# Patient Record
Sex: FEMALE | Race: WHITE | NOT HISPANIC OR LATINO
[De-identification: names, ages, dates, MRNs, and addresses within clinical notes are randomized per-mention and may not be internally consistent; named-entity substitution may affect disease eponyms.]

---

## 2018-05-25 ENCOUNTER — APPOINTMENT (OUTPATIENT)
Dept: INTERNAL MEDICINE | Facility: CLINIC | Age: 28
End: 2018-05-25
Payer: COMMERCIAL

## 2018-05-25 ENCOUNTER — NON-APPOINTMENT (OUTPATIENT)
Age: 28
End: 2018-05-25

## 2018-05-25 VITALS
HEART RATE: 92 BPM | WEIGHT: 179 LBS | OXYGEN SATURATION: 100 % | DIASTOLIC BLOOD PRESSURE: 78 MMHG | HEIGHT: 62.5 IN | TEMPERATURE: 98.4 F | BODY MASS INDEX: 32.12 KG/M2 | SYSTOLIC BLOOD PRESSURE: 120 MMHG

## 2018-05-25 DIAGNOSIS — Z87.19 PERSONAL HISTORY OF OTHER DISEASES OF THE DIGESTIVE SYSTEM: ICD-10-CM

## 2018-05-25 DIAGNOSIS — Z83.3 FAMILY HISTORY OF DIABETES MELLITUS: ICD-10-CM

## 2018-05-25 DIAGNOSIS — Z81.8 FAMILY HISTORY OF OTHER MENTAL AND BEHAVIORAL DISORDERS: ICD-10-CM

## 2018-05-25 DIAGNOSIS — Z78.9 OTHER SPECIFIED HEALTH STATUS: ICD-10-CM

## 2018-05-25 DIAGNOSIS — E28.2 POLYCYSTIC OVARIAN SYNDROME: ICD-10-CM

## 2018-05-25 DIAGNOSIS — Z82.49 FAMILY HISTORY OF ISCHEMIC HEART DISEASE AND OTHER DISEASES OF THE CIRCULATORY SYSTEM: ICD-10-CM

## 2018-05-25 DIAGNOSIS — G43.909 MIGRAINE, UNSPECIFIED, NOT INTRACTABLE, W/OUT STATUS MIGRAINOSUS: ICD-10-CM

## 2018-05-25 DIAGNOSIS — Z00.00 ENCOUNTER FOR GENERAL ADULT MEDICAL EXAMINATION W/OUT ABNORMAL FINDINGS: ICD-10-CM

## 2018-05-25 DIAGNOSIS — J45.909 UNSPECIFIED ASTHMA, UNCOMPLICATED: ICD-10-CM

## 2018-05-25 PROCEDURE — 93000 ELECTROCARDIOGRAM COMPLETE: CPT

## 2018-05-25 PROCEDURE — 99385 PREV VISIT NEW AGE 18-39: CPT | Mod: 25

## 2018-05-25 PROCEDURE — 36415 COLL VENOUS BLD VENIPUNCTURE: CPT

## 2018-05-25 NOTE — HEALTH RISK ASSESSMENT
[Excellent] : ~his/her~  mood as  excellent [Patient reported PAP Smear was normal] : Patient reported PAP Smear was normal [None] : None [With Family] : lives with family [Employed] : employed [Graduate School] : graduate school [] :  [# Of Children ___] : has [unfilled] children [Sexually Active] : sexually active [Smoke Detector] : smoke detector [Carbon Monoxide Detector] : carbon monoxide detector [Seat Belt] :  uses seat belt [Sunscreen] : uses sunscreen [] : No [Change in mental status noted] : No change in mental status noted [Reports changes in hearing] : Reports no changes in hearing [Reports changes in vision] : Reports no changes in vision [Reports changes in dental health] : Reports no changes in dental health [MammogramDate] : never  [PapSmearDate] : 03/17 [FreeTextEntry2] : teacher

## 2018-05-25 NOTE — PHYSICAL EXAM
[No Acute Distress] : no acute distress [Well Nourished] : well nourished [Well Developed] : well developed [Well-Appearing] : well-appearing [Normal Sclera/Conjunctiva] : normal sclera/conjunctiva [PERRL] : pupils equal round and reactive to light [EOMI] : extraocular movements intact [Normal Outer Ear/Nose] : the outer ears and nose were normal in appearance [Normal Oropharynx] : the oropharynx was normal [Normal TMs] : both tympanic membranes were normal [Supple] : supple [No Lymphadenopathy] : no lymphadenopathy [Thyroid Normal, No Nodules] : the thyroid was normal and there were no nodules present [No Respiratory Distress] : no respiratory distress  [Clear to Auscultation] : lungs were clear to auscultation bilaterally [No Accessory Muscle Use] : no accessory muscle use [Normal Rate] : normal rate  [Regular Rhythm] : with a regular rhythm [Normal S1, S2] : normal S1 and S2 [No Murmur] : no murmur heard [No Varicosities] : no varicosities [Pedal Pulses Present] : the pedal pulses are present [No Edema] : there was no peripheral edema [Soft] : abdomen soft [Non Tender] : non-tender [Non-distended] : non-distended [No Masses] : no abdominal mass palpated [No HSM] : no HSM [Normal Bowel Sounds] : normal bowel sounds [Normal Posterior Cervical Nodes] : no posterior cervical lymphadenopathy [Normal Anterior Cervical Nodes] : no anterior cervical lymphadenopathy [No CVA Tenderness] : no CVA  tenderness [No Spinal Tenderness] : no spinal tenderness [No Joint Swelling] : no joint swelling [Grossly Normal Strength/Tone] : grossly normal strength/tone [No Rash] : no rash [Normal Gait] : normal gait [Coordination Grossly Intact] : coordination grossly intact [No Focal Deficits] : no focal deficits [Deep Tendon Reflexes (DTR)] : deep tendon reflexes were 2+ and symmetric [Normal Affect] : the affect was normal [Normal Insight/Judgement] : insight and judgment were intact [Normal Mood] : the mood was normal [de-identified] : defer to GYN [de-identified] : defer to GYN

## 2018-05-25 NOTE — ASSESSMENT
[FreeTextEntry1] : Healthy young female comes for an annual exam.\par \par Check routine labs as below. EKG is normal. \par \par Diet and exercise discussed, BMI > 30. She will improve lifestyle changes.\par \par We discussed immunizations and patient is UTD. To receive influenza vaccine in the Fall.\par \par She is monogamous with her . Screen for HIV. \par \par She will see GYN next week for annual exam and PAP smear. Mammogram screening at age 38. She will discuss with her GYN about restarting Metformin for PCOS.\par \par Her asthma has remained quiet, no recent flares. \par \par It is unclear etiology of her recent right facial pain, BP was borderline elevated then but is now normal. Possible migraine episode. Will send Maxalt PRN.  \par \par RTO in 1 yr for annual exam or PRN for acute care.

## 2018-05-25 NOTE — PAST MEDICAL HISTORY
[Menstruating] : menstruating [Menarche Age ____] : age at menarche was [unfilled] [Normal Amount/Duration] : it was of a normal amount and duration [Regular Cycle Intervals] : have been regular [Total Preg ___] : G[unfilled] [Live Births ___] : P[unfilled]

## 2018-05-25 NOTE — HISTORY OF PRESENT ILLNESS
[FreeTextEntry1] : New pt - CPE [de-identified] : 27 yo female comes to establish medical care and for an annual exam. Last PCP was Dr. Nithin Breen in [place], last seen about 2 yr ago. \par \par Last Sunday afternoon, patient developed pain on right side of head and behind eye. Lasted a few days, stopped 2 days ago. Her BP reading was recently elevated in the school nurse's office, was 135/92. She has history pre-eclampsia in 2016, never took BP medication then. She feels better now but still not herself. No facial numbness. No changes in speech or vision.

## 2018-05-26 ENCOUNTER — MOBILE ON CALL (OUTPATIENT)
Age: 28
End: 2018-05-26

## 2018-05-30 DIAGNOSIS — E55.9 VITAMIN D DEFICIENCY, UNSPECIFIED: ICD-10-CM

## 2018-05-30 LAB
25(OH)D3 SERPL-MCNC: 25.4 NG/ML
ALBUMIN SERPL ELPH-MCNC: 4.6 G/DL
ALP BLD-CCNC: 133 U/L
ALT SERPL-CCNC: 19 U/L
ANION GAP SERPL CALC-SCNC: 14 MMOL/L
APPEARANCE: CLEAR
AST SERPL-CCNC: 24 U/L
BACTERIA: NEGATIVE
BASOPHILS # BLD AUTO: 0.02 K/UL
BASOPHILS NFR BLD AUTO: 0.2 %
BILIRUB SERPL-MCNC: 0.7 MG/DL
BILIRUBIN URINE: NEGATIVE
BLOOD URINE: ABNORMAL
BUN SERPL-MCNC: 13 MG/DL
CALCIUM SERPL-MCNC: 9.3 MG/DL
CHLORIDE SERPL-SCNC: 103 MMOL/L
CHOLEST SERPL-MCNC: 172 MG/DL
CHOLEST/HDLC SERPL: 3.8 RATIO
CO2 SERPL-SCNC: 24 MMOL/L
COLOR: YELLOW
CREAT SERPL-MCNC: 0.72 MG/DL
EOSINOPHIL # BLD AUTO: 0.1 K/UL
EOSINOPHIL NFR BLD AUTO: 1.1 %
GLUCOSE QUALITATIVE U: NEGATIVE MG/DL
GLUCOSE SERPL-MCNC: 80 MG/DL
HBA1C MFR BLD HPLC: 4.9 %
HCT VFR BLD CALC: 39 %
HDLC SERPL-MCNC: 45 MG/DL
HGB BLD-MCNC: 13.1 G/DL
HIV1+2 AB SPEC QL IA.RAPID: NONREACTIVE
HYALINE CASTS: 1 /LPF
IMM GRANULOCYTES NFR BLD AUTO: 0.3 %
KETONES URINE: NEGATIVE
LDLC SERPL CALC-MCNC: 109 MG/DL
LEUKOCYTE ESTERASE URINE: ABNORMAL
LYMPHOCYTES # BLD AUTO: 3.25 K/UL
LYMPHOCYTES NFR BLD AUTO: 36.6 %
MAN DIFF?: NORMAL
MCHC RBC-ENTMCNC: 29.4 PG
MCHC RBC-ENTMCNC: 33.6 GM/DL
MCV RBC AUTO: 87.6 FL
MICROSCOPIC-UA: NORMAL
MONOCYTES # BLD AUTO: 0.27 K/UL
MONOCYTES NFR BLD AUTO: 3 %
NEUTROPHILS # BLD AUTO: 5.22 K/UL
NEUTROPHILS NFR BLD AUTO: 58.8 %
NITRITE URINE: NEGATIVE
PH URINE: 8
PLATELET # BLD AUTO: 241 K/UL
POTASSIUM SERPL-SCNC: 4 MMOL/L
PROT SERPL-MCNC: 7.7 G/DL
PROTEIN URINE: NEGATIVE MG/DL
RBC # BLD: 4.45 M/UL
RBC # FLD: 12.9 %
RED BLOOD CELLS URINE: 1 /HPF
SODIUM SERPL-SCNC: 141 MMOL/L
SPECIFIC GRAVITY URINE: 1.01
SQUAMOUS EPITHELIAL CELLS: 3 /HPF
T4 FREE SERPL-MCNC: 1.2 NG/DL
TRIGL SERPL-MCNC: 88 MG/DL
TSH SERPL-ACNC: 2.57 UIU/ML
UROBILINOGEN URINE: NEGATIVE MG/DL
WBC # FLD AUTO: 8.89 K/UL
WHITE BLOOD CELLS URINE: 1 /HPF

## 2018-11-23 ENCOUNTER — APPOINTMENT (OUTPATIENT)
Dept: ANTEPARTUM | Facility: CLINIC | Age: 28
End: 2018-11-23
Payer: COMMERCIAL

## 2018-11-23 ENCOUNTER — ASOB RESULT (OUTPATIENT)
Age: 28
End: 2018-11-23

## 2018-11-23 PROCEDURE — 76813 OB US NUCHAL MEAS 1 GEST: CPT

## 2018-11-23 PROCEDURE — 99241 OFFICE CONSULTATION NEW/ESTAB PATIENT 15 MIN: CPT | Mod: 25

## 2018-11-23 PROCEDURE — 76801 OB US < 14 WKS SINGLE FETUS: CPT

## 2018-11-23 PROCEDURE — 36416 COLLJ CAPILLARY BLOOD SPEC: CPT

## 2018-12-13 ENCOUNTER — APPOINTMENT (OUTPATIENT)
Dept: INTERNAL MEDICINE | Facility: CLINIC | Age: 28
End: 2018-12-13
Payer: COMMERCIAL

## 2018-12-13 VITALS
OXYGEN SATURATION: 100 % | TEMPERATURE: 99.2 F | WEIGHT: 174 LBS | BODY MASS INDEX: 30.83 KG/M2 | DIASTOLIC BLOOD PRESSURE: 70 MMHG | SYSTOLIC BLOOD PRESSURE: 110 MMHG | HEART RATE: 124 BPM | HEIGHT: 63 IN

## 2018-12-13 DIAGNOSIS — Z87.09 PERSONAL HISTORY OF OTHER DISEASES OF THE RESPIRATORY SYSTEM: ICD-10-CM

## 2018-12-13 PROCEDURE — 99213 OFFICE O/P EST LOW 20 MIN: CPT

## 2018-12-13 RX ORDER — NORETHINDRONE ACETATE AND ETHINYL ESTRADIOL AND FERROUS FUMARATE 1MG-20(21)
1-20 KIT ORAL
Refills: 0 | Status: DISCONTINUED | COMMUNITY
End: 2018-12-13

## 2018-12-13 RX ORDER — RIZATRIPTAN BENZOATE 5 MG/1
5 TABLET ORAL
Qty: 7 | Refills: 1 | Status: DISCONTINUED | COMMUNITY
Start: 2018-05-25 | End: 2018-12-13

## 2018-12-13 NOTE — ASSESSMENT
[FreeTextEntry1] : Patient likely has acute sinusitis. She is penicillin-allergic and is 15 weeks pregnant. We will treat with a Z-pack (pregnancy category B). Start Flonase which is safe for pregnancy. Advised patient to discuss with OB if Z-pack is appropriate to take while pregnant. Continue Tylenol PRN. Monitor temps. Rest and hydrate well.

## 2018-12-13 NOTE — HISTORY OF PRESENT ILLNESS
[FreeTextEntry8] : Patient comes for an acute visit. \par \par She is here for evaluation of fever. She initially developed sinus pressure, nasal congestion, and cough productive of green sputum about 1 week ago. She had a fever last weekend then had recurrent fever of 100-101F last night. She took Tylenol for fever. She still has sinus pressure in frontal and maxillary areas. She is 15 weeks pregnant - following with OB, Dr. Lizette Ordaz in [place].  \par

## 2018-12-13 NOTE — REVIEW OF SYSTEMS
[Fever] : fever [Chills] : chills [Fatigue] : fatigue [Earache] : no earache [Hoarseness] : no hoarseness [Nasal Discharge] : nasal discharge [Sore Throat] : sore throat [Postnasal Drip] : postnasal drip [Chest Pain] : no chest pain [Lower Ext Edema] : no lower extremity edema [Shortness Of Breath] : no shortness of breath [Cough] : cough [Abdominal Pain] : no abdominal pain [Nausea] : no nausea [Diarrhea] : diarrhea

## 2018-12-13 NOTE — PHYSICAL EXAM
[No Acute Distress] : no acute distress [Well Nourished] : well nourished [Well Developed] : well developed [PERRL] : pupils equal round and reactive to light [EOMI] : extraocular movements intact [Normal Oropharynx] : the oropharynx was normal [Normal TMs] : both tympanic membranes were normal [Supple] : supple [No Lymphadenopathy] : no lymphadenopathy [No Respiratory Distress] : no respiratory distress  [Clear to Auscultation] : lungs were clear to auscultation bilaterally [Regular Rhythm] : with a regular rhythm [Normal S1, S2] : normal S1 and S2 [No Murmur] : no murmur heard [No Edema] : there was no peripheral edema [Soft] : abdomen soft [Non Tender] : non-tender [de-identified] : frontal/maxillary sinus tenderness  [de-identified] : tachycardic

## 2019-01-08 ENCOUNTER — MEDICATION RENEWAL (OUTPATIENT)
Age: 29
End: 2019-01-08

## 2019-01-24 ENCOUNTER — ASOB RESULT (OUTPATIENT)
Age: 29
End: 2019-01-24

## 2019-01-24 ENCOUNTER — APPOINTMENT (OUTPATIENT)
Dept: ANTEPARTUM | Facility: CLINIC | Age: 29
End: 2019-01-24
Payer: COMMERCIAL

## 2019-01-24 PROCEDURE — 76811 OB US DETAILED SNGL FETUS: CPT

## 2019-01-24 PROCEDURE — 76817 TRANSVAGINAL US OBSTETRIC: CPT | Mod: 59

## 2019-03-15 ENCOUNTER — OUTPATIENT (OUTPATIENT)
Dept: OUTPATIENT SERVICES | Facility: HOSPITAL | Age: 29
LOS: 1 days | End: 2019-03-15
Payer: COMMERCIAL

## 2019-03-15 DIAGNOSIS — Z3A.00 WEEKS OF GESTATION OF PREGNANCY NOT SPECIFIED: ICD-10-CM

## 2019-03-15 DIAGNOSIS — Z98.89 OTHER SPECIFIED POSTPROCEDURAL STATES: Chronic | ICD-10-CM

## 2019-03-15 DIAGNOSIS — K08.499 PARTIAL LOSS OF TEETH DUE TO OTHER SPECIFIED CAUSE, UNSPECIFIED CLASS: Chronic | ICD-10-CM

## 2019-03-15 DIAGNOSIS — O26.899 OTHER SPECIFIED PREGNANCY RELATED CONDITIONS, UNSPECIFIED TRIMESTER: ICD-10-CM

## 2019-03-15 LAB
APPEARANCE UR: CLEAR — SIGNIFICANT CHANGE UP
BACTERIA # UR AUTO: ABNORMAL
BILIRUB UR-MCNC: NEGATIVE — SIGNIFICANT CHANGE UP
COLOR SPEC: YELLOW — SIGNIFICANT CHANGE UP
DIFF PNL FLD: ABNORMAL
EPI CELLS # UR: ABNORMAL
GLUCOSE UR QL: NEGATIVE — SIGNIFICANT CHANGE UP
KETONES UR-MCNC: NEGATIVE — SIGNIFICANT CHANGE UP
LEUKOCYTE ESTERASE UR-ACNC: ABNORMAL
NITRITE UR-MCNC: NEGATIVE — SIGNIFICANT CHANGE UP
PH UR: 8.5 — HIGH (ref 5–8)
PROT UR-MCNC: NEGATIVE — SIGNIFICANT CHANGE UP
RBC CASTS # UR COMP ASSIST: SIGNIFICANT CHANGE UP /HPF (ref 0–4)
SP GR SPEC: 1.01 — LOW (ref 1.01–1.02)
UROBILINOGEN FLD QL: NEGATIVE — SIGNIFICANT CHANGE UP
WBC UR QL: ABNORMAL

## 2019-03-15 PROCEDURE — 81001 URINALYSIS AUTO W/SCOPE: CPT

## 2019-03-15 PROCEDURE — G0463: CPT

## 2019-03-15 PROCEDURE — 76817 TRANSVAGINAL US OBSTETRIC: CPT | Mod: 26

## 2019-03-15 PROCEDURE — 59025 FETAL NON-STRESS TEST: CPT

## 2019-04-11 ENCOUNTER — ASOB RESULT (OUTPATIENT)
Age: 29
End: 2019-04-11

## 2019-04-11 ENCOUNTER — APPOINTMENT (OUTPATIENT)
Dept: ANTEPARTUM | Facility: CLINIC | Age: 29
End: 2019-04-11
Payer: COMMERCIAL

## 2019-04-11 PROCEDURE — 76816 OB US FOLLOW-UP PER FETUS: CPT

## 2019-05-17 ENCOUNTER — OUTPATIENT (OUTPATIENT)
Dept: OUTPATIENT SERVICES | Facility: HOSPITAL | Age: 29
LOS: 1 days | End: 2019-05-17
Payer: COMMERCIAL

## 2019-05-17 DIAGNOSIS — Z01.818 ENCOUNTER FOR OTHER PREPROCEDURAL EXAMINATION: ICD-10-CM

## 2019-05-17 DIAGNOSIS — Z98.89 OTHER SPECIFIED POSTPROCEDURAL STATES: Chronic | ICD-10-CM

## 2019-05-17 DIAGNOSIS — K08.499 PARTIAL LOSS OF TEETH DUE TO OTHER SPECIFIED CAUSE, UNSPECIFIED CLASS: Chronic | ICD-10-CM

## 2019-05-17 DIAGNOSIS — O34.219 MATERNAL CARE FOR UNSPECIFIED TYPE SCAR FROM PREVIOUS CESAREAN DELIVERY: ICD-10-CM

## 2019-05-17 LAB
ANION GAP SERPL CALC-SCNC: 10 MMOL/L — SIGNIFICANT CHANGE UP (ref 5–17)
BLD GP AB SCN SERPL QL: NEGATIVE — SIGNIFICANT CHANGE UP
BUN SERPL-MCNC: 6 MG/DL — LOW (ref 7–23)
CALCIUM SERPL-MCNC: 9.1 MG/DL — SIGNIFICANT CHANGE UP (ref 8.4–10.5)
CHLORIDE SERPL-SCNC: 104 MMOL/L — SIGNIFICANT CHANGE UP (ref 96–108)
CO2 SERPL-SCNC: 21 MMOL/L — LOW (ref 22–31)
CREAT SERPL-MCNC: 0.38 MG/DL — LOW (ref 0.5–1.3)
GLUCOSE SERPL-MCNC: 78 MG/DL — SIGNIFICANT CHANGE UP (ref 70–99)
HCT VFR BLD CALC: 30.5 % — LOW (ref 34.5–45)
HGB BLD-MCNC: 10.4 G/DL — LOW (ref 11.5–15.5)
MCHC RBC-ENTMCNC: 29.5 PG — SIGNIFICANT CHANGE UP (ref 27–34)
MCHC RBC-ENTMCNC: 34.1 GM/DL — SIGNIFICANT CHANGE UP (ref 32–36)
MCV RBC AUTO: 86.6 FL — SIGNIFICANT CHANGE UP (ref 80–100)
PLATELET # BLD AUTO: 262 K/UL — SIGNIFICANT CHANGE UP (ref 150–400)
POTASSIUM SERPL-MCNC: 3.3 MMOL/L — LOW (ref 3.5–5.3)
POTASSIUM SERPL-SCNC: 3.3 MMOL/L — LOW (ref 3.5–5.3)
RBC # BLD: 3.52 M/UL — LOW (ref 3.8–5.2)
RBC # FLD: 14 % — SIGNIFICANT CHANGE UP (ref 10.3–14.5)
RH IG SCN BLD-IMP: POSITIVE — SIGNIFICANT CHANGE UP
SODIUM SERPL-SCNC: 135 MMOL/L — SIGNIFICANT CHANGE UP (ref 135–145)
WBC # BLD: 7.54 K/UL — SIGNIFICANT CHANGE UP (ref 3.8–10.5)
WBC # FLD AUTO: 7.54 K/UL — SIGNIFICANT CHANGE UP (ref 3.8–10.5)

## 2019-05-17 PROCEDURE — 86900 BLOOD TYPING SEROLOGIC ABO: CPT

## 2019-05-17 PROCEDURE — 86850 RBC ANTIBODY SCREEN: CPT

## 2019-05-17 PROCEDURE — 85027 COMPLETE CBC AUTOMATED: CPT

## 2019-05-17 PROCEDURE — 80048 BASIC METABOLIC PNL TOTAL CA: CPT

## 2019-05-17 PROCEDURE — G0463: CPT

## 2019-05-17 PROCEDURE — 86901 BLOOD TYPING SEROLOGIC RH(D): CPT

## 2019-05-25 ENCOUNTER — INPATIENT (INPATIENT)
Facility: HOSPITAL | Age: 29
LOS: 3 days | Discharge: HOME | End: 2019-05-29
Attending: OBSTETRICS & GYNECOLOGY | Admitting: OBSTETRICS & GYNECOLOGY
Payer: COMMERCIAL

## 2019-05-25 ENCOUNTER — TRANSCRIPTION ENCOUNTER (OUTPATIENT)
Age: 29
End: 2019-05-25

## 2019-05-25 DIAGNOSIS — O26.899 OTHER SPECIFIED PREGNANCY RELATED CONDITIONS, UNSPECIFIED TRIMESTER: ICD-10-CM

## 2019-05-25 DIAGNOSIS — Z3A.00 WEEKS OF GESTATION OF PREGNANCY NOT SPECIFIED: ICD-10-CM

## 2019-05-25 DIAGNOSIS — K08.499 PARTIAL LOSS OF TEETH DUE TO OTHER SPECIFIED CAUSE, UNSPECIFIED CLASS: Chronic | ICD-10-CM

## 2019-05-25 DIAGNOSIS — Z34.80 ENCOUNTER FOR SUPERVISION OF OTHER NORMAL PREGNANCY, UNSPECIFIED TRIMESTER: ICD-10-CM

## 2019-05-25 DIAGNOSIS — Z98.89 OTHER SPECIFIED POSTPROCEDURAL STATES: Chronic | ICD-10-CM

## 2019-05-25 LAB
BASOPHILS # BLD AUTO: 0 K/UL — SIGNIFICANT CHANGE UP (ref 0–0.2)
BASOPHILS NFR BLD AUTO: 0.1 % — SIGNIFICANT CHANGE UP (ref 0–2)
BLD GP AB SCN SERPL QL: NEGATIVE — SIGNIFICANT CHANGE UP
EOSINOPHIL # BLD AUTO: 0.1 K/UL — SIGNIFICANT CHANGE UP (ref 0–0.5)
EOSINOPHIL NFR BLD AUTO: 0.6 % — SIGNIFICANT CHANGE UP (ref 0–6)
HCT VFR BLD CALC: 30.7 % — LOW (ref 34.5–45)
HGB BLD-MCNC: 11.1 G/DL — LOW (ref 11.5–15.5)
LYMPHOCYTES # BLD AUTO: 1.8 K/UL — SIGNIFICANT CHANGE UP (ref 1–3.3)
LYMPHOCYTES # BLD AUTO: 18.8 % — SIGNIFICANT CHANGE UP (ref 13–44)
MCHC RBC-ENTMCNC: 31.2 PG — SIGNIFICANT CHANGE UP (ref 27–34)
MCHC RBC-ENTMCNC: 36.3 GM/DL — HIGH (ref 32–36)
MCV RBC AUTO: 86 FL — SIGNIFICANT CHANGE UP (ref 80–100)
MONOCYTES # BLD AUTO: 0.5 K/UL — SIGNIFICANT CHANGE UP (ref 0–0.9)
MONOCYTES NFR BLD AUTO: 5.3 % — SIGNIFICANT CHANGE UP (ref 2–14)
NEUTROPHILS # BLD AUTO: 7.3 K/UL — SIGNIFICANT CHANGE UP (ref 1.8–7.4)
NEUTROPHILS NFR BLD AUTO: 75.2 % — SIGNIFICANT CHANGE UP (ref 43–77)
PLATELET # BLD AUTO: 191 K/UL — SIGNIFICANT CHANGE UP (ref 150–400)
RBC # BLD: 3.57 M/UL — LOW (ref 3.8–5.2)
RBC # FLD: 13.5 % — SIGNIFICANT CHANGE UP (ref 10.3–14.5)
RH IG SCN BLD-IMP: POSITIVE — SIGNIFICANT CHANGE UP
WBC # BLD: 9.7 K/UL — SIGNIFICANT CHANGE UP (ref 3.8–10.5)
WBC # FLD AUTO: 9.7 K/UL — SIGNIFICANT CHANGE UP (ref 3.8–10.5)

## 2019-05-25 RX ORDER — CITRIC ACID/SODIUM CITRATE 300-500 MG
15 SOLUTION, ORAL ORAL EVERY 6 HOURS
Refills: 0 | Status: DISCONTINUED | OUTPATIENT
Start: 2019-05-25 | End: 2019-05-26

## 2019-05-25 RX ORDER — SODIUM CHLORIDE 9 MG/ML
1000 INJECTION, SOLUTION INTRAVENOUS
Refills: 0 | Status: DISCONTINUED | OUTPATIENT
Start: 2019-05-25 | End: 2019-05-26

## 2019-05-25 RX ORDER — SODIUM CHLORIDE 9 MG/ML
500 INJECTION, SOLUTION INTRAVENOUS ONCE
Refills: 0 | Status: COMPLETED | OUTPATIENT
Start: 2019-05-25 | End: 2019-05-25

## 2019-05-25 RX ORDER — OXYTOCIN 10 UNIT/ML
333.33 VIAL (ML) INJECTION
Qty: 20 | Refills: 0 | Status: COMPLETED | OUTPATIENT
Start: 2019-05-25 | End: 2019-05-26

## 2019-05-25 RX ADMIN — SODIUM CHLORIDE 125 MILLILITER(S): 9 INJECTION, SOLUTION INTRAVENOUS at 23:48

## 2019-05-25 RX ADMIN — SODIUM CHLORIDE 1000 MILLILITER(S): 9 INJECTION, SOLUTION INTRAVENOUS at 22:10

## 2019-05-26 VITALS
SYSTOLIC BLOOD PRESSURE: 112 MMHG | HEART RATE: 91 BPM | TEMPERATURE: 98 F | DIASTOLIC BLOOD PRESSURE: 63 MMHG | RESPIRATION RATE: 18 BRPM | OXYGEN SATURATION: 100 %

## 2019-05-26 LAB — T PALLIDUM AB TITR SER: NEGATIVE — SIGNIFICANT CHANGE UP

## 2019-05-26 RX ORDER — ACETAMINOPHEN 500 MG
975 TABLET ORAL EVERY 6 HOURS
Refills: 0 | Status: DISCONTINUED | OUTPATIENT
Start: 2019-05-26 | End: 2019-05-29

## 2019-05-26 RX ORDER — IBUPROFEN 200 MG
600 TABLET ORAL EVERY 6 HOURS
Refills: 0 | Status: COMPLETED | OUTPATIENT
Start: 2019-05-26 | End: 2020-04-23

## 2019-05-26 RX ORDER — KETOROLAC TROMETHAMINE 30 MG/ML
30 SYRINGE (ML) INJECTION EVERY 6 HOURS
Refills: 0 | Status: DISCONTINUED | OUTPATIENT
Start: 2019-05-26 | End: 2019-05-28

## 2019-05-26 RX ORDER — LANOLIN
1 OINTMENT (GRAM) TOPICAL EVERY 6 HOURS
Refills: 0 | Status: DISCONTINUED | OUTPATIENT
Start: 2019-05-26 | End: 2019-05-29

## 2019-05-26 RX ORDER — OXYTOCIN 10 UNIT/ML
333.33 VIAL (ML) INJECTION
Qty: 20 | Refills: 0 | Status: DISCONTINUED | OUTPATIENT
Start: 2019-05-26 | End: 2019-05-29

## 2019-05-26 RX ORDER — OXYCODONE HYDROCHLORIDE 5 MG/1
5 TABLET ORAL ONCE
Refills: 0 | Status: DISCONTINUED | OUTPATIENT
Start: 2019-05-26 | End: 2019-05-29

## 2019-05-26 RX ORDER — SODIUM CHLORIDE 9 MG/ML
1000 INJECTION, SOLUTION INTRAVENOUS
Refills: 0 | Status: DISCONTINUED | OUTPATIENT
Start: 2019-05-26 | End: 2019-05-29

## 2019-05-26 RX ORDER — SIMETHICONE 80 MG/1
80 TABLET, CHEWABLE ORAL EVERY 4 HOURS
Refills: 0 | Status: DISCONTINUED | OUTPATIENT
Start: 2019-05-26 | End: 2019-05-29

## 2019-05-26 RX ORDER — HEPARIN SODIUM 5000 [USP'U]/ML
5000 INJECTION INTRAVENOUS; SUBCUTANEOUS EVERY 12 HOURS
Refills: 0 | Status: DISCONTINUED | OUTPATIENT
Start: 2019-05-26 | End: 2019-05-29

## 2019-05-26 RX ORDER — NALOXONE HYDROCHLORIDE 4 MG/.1ML
0.1 SPRAY NASAL
Refills: 0 | Status: DISCONTINUED | OUTPATIENT
Start: 2019-05-26 | End: 2019-05-28

## 2019-05-26 RX ORDER — DEXAMETHASONE 0.5 MG/5ML
4 ELIXIR ORAL EVERY 6 HOURS
Refills: 0 | Status: DISCONTINUED | OUTPATIENT
Start: 2019-05-26 | End: 2019-05-28

## 2019-05-26 RX ORDER — ONDANSETRON 8 MG/1
4 TABLET, FILM COATED ORAL EVERY 6 HOURS
Refills: 0 | Status: DISCONTINUED | OUTPATIENT
Start: 2019-05-26 | End: 2019-05-28

## 2019-05-26 RX ORDER — DOCUSATE SODIUM 100 MG
100 CAPSULE ORAL
Refills: 0 | Status: DISCONTINUED | OUTPATIENT
Start: 2019-05-26 | End: 2019-05-29

## 2019-05-26 RX ORDER — DIPHENHYDRAMINE HCL 50 MG
25 CAPSULE ORAL EVERY 6 HOURS
Refills: 0 | Status: DISCONTINUED | OUTPATIENT
Start: 2019-05-26 | End: 2019-05-29

## 2019-05-26 RX ORDER — TETANUS TOXOID, REDUCED DIPHTHERIA TOXOID AND ACELLULAR PERTUSSIS VACCINE, ADSORBED 5; 2.5; 8; 8; 2.5 [IU]/.5ML; [IU]/.5ML; UG/.5ML; UG/.5ML; UG/.5ML
0.5 SUSPENSION INTRAMUSCULAR ONCE
Refills: 0 | Status: DISCONTINUED | OUTPATIENT
Start: 2019-05-26 | End: 2019-05-29

## 2019-05-26 RX ORDER — GLYCERIN ADULT
1 SUPPOSITORY, RECTAL RECTAL AT BEDTIME
Refills: 0 | Status: DISCONTINUED | OUTPATIENT
Start: 2019-05-26 | End: 2019-05-29

## 2019-05-26 RX ORDER — MAGNESIUM HYDROXIDE 400 MG/1
30 TABLET, CHEWABLE ORAL
Refills: 0 | Status: DISCONTINUED | OUTPATIENT
Start: 2019-05-26 | End: 2019-05-29

## 2019-05-26 RX ORDER — OXYCODONE HYDROCHLORIDE 5 MG/1
5 TABLET ORAL
Refills: 0 | Status: COMPLETED | OUTPATIENT
Start: 2019-05-26 | End: 2019-06-02

## 2019-05-26 RX ORDER — FENTANYL/BUPIVACAINE/NS/PF 2MCG/ML-.1
5 PLASTIC BAG, INJECTION (ML) INJECTION
Refills: 0 | Status: DISCONTINUED | OUTPATIENT
Start: 2019-05-26 | End: 2019-05-28

## 2019-05-26 RX ADMIN — SODIUM CHLORIDE 125 MILLILITER(S): 9 INJECTION, SOLUTION INTRAVENOUS at 09:46

## 2019-05-26 RX ADMIN — Medication 15 MILLILITER(S): at 09:21

## 2019-05-26 RX ADMIN — Medication 30 MILLIGRAM(S): at 17:40

## 2019-05-26 RX ADMIN — Medication 975 MILLIGRAM(S): at 16:45

## 2019-05-26 RX ADMIN — Medication 1000 MILLIUNIT(S)/MIN: at 10:35

## 2019-05-26 RX ADMIN — Medication 30 MILLIGRAM(S): at 23:56

## 2019-05-26 RX ADMIN — HEPARIN SODIUM 5000 UNIT(S): 5000 INJECTION INTRAVENOUS; SUBCUTANEOUS at 21:15

## 2019-05-26 RX ADMIN — Medication 975 MILLIGRAM(S): at 16:12

## 2019-05-26 RX ADMIN — SODIUM CHLORIDE 125 MILLILITER(S): 9 INJECTION, SOLUTION INTRAVENOUS at 07:20

## 2019-05-27 LAB
BASOPHILS # BLD AUTO: 0.01 K/UL — SIGNIFICANT CHANGE UP (ref 0–0.2)
BASOPHILS NFR BLD AUTO: 0.1 % — SIGNIFICANT CHANGE UP (ref 0–2)
EOSINOPHIL # BLD AUTO: 0.04 K/UL — SIGNIFICANT CHANGE UP (ref 0–0.5)
EOSINOPHIL NFR BLD AUTO: 0.4 % — SIGNIFICANT CHANGE UP (ref 0–6)
HCT VFR BLD CALC: 29.7 % — LOW (ref 34.5–45)
HGB BLD-MCNC: 9.7 G/DL — LOW (ref 11.5–15.5)
IMM GRANULOCYTES NFR BLD AUTO: 1.4 % — SIGNIFICANT CHANGE UP (ref 0–1.5)
LYMPHOCYTES # BLD AUTO: 2.36 K/UL — SIGNIFICANT CHANGE UP (ref 1–3.3)
LYMPHOCYTES # BLD AUTO: 24.1 % — SIGNIFICANT CHANGE UP (ref 13–44)
MCHC RBC-ENTMCNC: 29.1 PG — SIGNIFICANT CHANGE UP (ref 27–34)
MCHC RBC-ENTMCNC: 32.7 GM/DL — SIGNIFICANT CHANGE UP (ref 32–36)
MCV RBC AUTO: 89.2 FL — SIGNIFICANT CHANGE UP (ref 80–100)
MONOCYTES # BLD AUTO: 0.64 K/UL — SIGNIFICANT CHANGE UP (ref 0–0.9)
MONOCYTES NFR BLD AUTO: 6.5 % — SIGNIFICANT CHANGE UP (ref 2–14)
NEUTROPHILS # BLD AUTO: 6.59 K/UL — SIGNIFICANT CHANGE UP (ref 1.8–7.4)
NEUTROPHILS NFR BLD AUTO: 67.5 % — SIGNIFICANT CHANGE UP (ref 43–77)
PLATELET # BLD AUTO: 171 K/UL — SIGNIFICANT CHANGE UP (ref 150–400)
RBC # BLD: 3.33 M/UL — LOW (ref 3.8–5.2)
RBC # FLD: 14.5 % — SIGNIFICANT CHANGE UP (ref 10.3–14.5)
WBC # BLD: 9.78 K/UL — SIGNIFICANT CHANGE UP (ref 3.8–10.5)
WBC # FLD AUTO: 9.78 K/UL — SIGNIFICANT CHANGE UP (ref 3.8–10.5)

## 2019-05-27 PROCEDURE — 59514 CESAREAN DELIVERY ONLY: CPT | Mod: AS,U8

## 2019-05-27 RX ADMIN — HEPARIN SODIUM 5000 UNIT(S): 5000 INJECTION INTRAVENOUS; SUBCUTANEOUS at 10:34

## 2019-05-27 RX ADMIN — Medication 30 MILLIGRAM(S): at 00:56

## 2019-05-27 RX ADMIN — HEPARIN SODIUM 5000 UNIT(S): 5000 INJECTION INTRAVENOUS; SUBCUTANEOUS at 21:09

## 2019-05-27 RX ADMIN — SIMETHICONE 80 MILLIGRAM(S): 80 TABLET, CHEWABLE ORAL at 18:23

## 2019-05-27 RX ADMIN — Medication 30 MILLIGRAM(S): at 06:05

## 2019-05-27 RX ADMIN — Medication 30 MILLIGRAM(S): at 23:05

## 2019-05-27 RX ADMIN — Medication 975 MILLIGRAM(S): at 18:24

## 2019-05-27 RX ADMIN — Medication 30 MILLIGRAM(S): at 12:50

## 2019-05-27 RX ADMIN — Medication 30 MILLIGRAM(S): at 12:20

## 2019-05-27 NOTE — PROGRESS NOTE ADULT - SUBJECTIVE AND OBJECTIVE BOX
Postpartum Note-  Section POD#1    Allergies    Ceclor (Hives)  clarithromycin (Rash)  clindamycin (Hives)  dust (Sneezing)  penicillin (Hives)  walnut (Hives)    Intolerances      Prenatal labs:    Rubella IgG:   Immune        RPR: Negative        Blood Type: O+    S:Patient is a  29y G 2   P 2   POD#1 S/P   repeat C/Sec, failed TOLAC  Patient w/o complaints, pain is controlled.  Pt is OOB, tolerating PO, passing flatus. Lochia WNL.   Feeding: Breastfeeding    O:  Vital Signs Last 24 Hrs  T(C): 36.6 (27 May 2019 05:00), Max: 36.9 (26 May 2019 15:30)  T(F): 97.8 (27 May 2019 05:00), Max: 98.4 (26 May 2019 15:30)  HR: 70 (27 May 2019 05:00) (66 - 91)  BP: 120/68 (27 May 2019 05:00) (109/68 - 129/69)  BP(mean): 85 (26 May 2019 12:45) (82 - 85)  RR: 18 (27 May 2019 05:00) (18 - 18)  SpO2: 99% (27 May 2019 05:00) (97% - 100%)  I&O's Summary    26 May 2019 07:01  -  27 May 2019 07:00  --------------------------------------------------------  IN: 4400 mL / OUT: 3100 mL / NET: 1300 mL        Gen: NAD  CV: rrr s1s2, CTABL  Abdomen: Soft, nontender, mildly distended, fundus firm.  Bowel sounds x 4 quadrants  Incision: Clean, dry, and intact.  Negative erythema/edema/ecchymosis   Sub Q with dermabond  Lochia WNL  Ext: PAS in place. Negative Homans B/L.  Pedal pulses palpated B/L    LABS:                          11.1   9.7   )-----------( 191      ( 25 May 2019 22:29 )             30.7       A/P:  29y  POD # 1 S/P  repeat   section, failed TOLAC , doing well    PAST MEDICAL & SURGICAL HISTORY:  Pre-eclampsia with previous pregnancy  Asthma  PCOS (polycystic ovarian syndrome)  [place] teeth extracted  H/O:     Current Issues: none    Increase OOB  Renew IVF  Renew PCEA  DVT ppx  Regular diet  AM CBC  Routine Postpartum/Post-op care

## 2019-05-27 NOTE — PROGRESS NOTE ADULT - ATTENDING COMMENTS
Doing well  VSS afeb  Abd S NT ND FF min lochia  inc c/d/i  S/P C/S, stable  check labs  reg diet  routine care

## 2019-05-27 NOTE — PROGRESS NOTE ADULT - SUBJECTIVE AND OBJECTIVE BOX
Day 1 of Anesthesia Pain Management Service    SUBJECTIVE: I'm okay  Pain Scale Score:    [X] Refer to charted pain scores    THERAPY: Epidural Bupivacaine 0.01 % and Fentanyl 3 micrograms/mL     Demand Dose: 3 mL  Lockout: 15 minutes   Continuous Rate:  10 mL    MEDICATIONS  (STANDING):  acetaminophen   Tablet .. 975 milliGRAM(s) Oral every 6 hours  diphtheria/tetanus/pertussis (acellular) Vaccine (ADAcel) 0.5 milliLiter(s) IntraMuscular once  fentaNYL (3 MICROgram(s)/mL) + BUpivacaine 0.01% in 0.9% Sodium Chloride PCEA 250 milliLiter(s) Epidural PCA Continuous  heparin  Injectable 5000 Unit(s) SubCutaneous every 12 hours  ibuprofen  Tablet. 600 milliGRAM(s) Oral every 6 hours  ketorolac   Injectable 30 milliGRAM(s) IV Push every 6 hours  lactated ringers. 1000 milliLiter(s) (125 mL/Hr) IV Continuous <Continuous>  oxytocin Infusion 333.333 milliUNIT(s)/Min (1000 mL/Hr) IV Continuous <Continuous>    MEDICATIONS  (PRN):  dexamethasone  Injectable 4 milliGRAM(s) IV Push every 6 hours PRN Nausea  diphenhydrAMINE 25 milliGRAM(s) Oral every 6 hours PRN Itching  docusate sodium 100 milliGRAM(s) Oral two times a day PRN Stool softening  fentaNYL (3 MICROgram(s)/mL) + BUpivacaine 0.01% in 0.9% Sodium Chloride PCEA Rescue Clinician Bolus 5 milliLiter(s) Epidural every 15 minutes PRN Moderate Pain (4 - 6)  glycerin Suppository - Adult 1 Suppository(s) Rectal at bedtime PRN Constipation  lanolin Ointment 1 Application(s) Topical every 6 hours PRN Sore Nipples  magnesium hydroxide Suspension 30 milliLiter(s) Oral two times a day PRN Constipation  naloxone Injectable 0.1 milliGRAM(s) IV Push every 3 minutes PRN For ANY of the following changes in patient status:  A. RR LESS THAN 10 breaths per minute, B. Oxygen saturation LESS THAN 90%, C. Sedation score of 6  ondansetron Injectable 4 milliGRAM(s) IV Push every 6 hours PRN Nausea  oxyCODONE    IR 5 milliGRAM(s) Oral every 3 hours PRN Moderate Pain (4 - 6)  oxyCODONE    IR 5 milliGRAM(s) Oral once PRN Severe Pain (7 - 10)  simethicone 80 milliGRAM(s) Chew every 4 hours PRN Gas      OBJECTIVE:    Assessment of Epidural Catheter Site: 	    [X] Dressing intact	[X] Site non-tender	[X] Site without erythema, discharge, edema  [X] Epidural tubing and connection checked	[X] Gross neurological exam within normal limits  [ ] Catheter removed – tip intact		                          9.7    9.78  )-----------( 171      ( 27 May 2019 08:58 )             29.7     Vital Signs Last 24 Hrs  T(C): 36.4 (05-27-19 @ 09:45), Max: 36.9 (05-26-19 @ 15:30)  T(F): 97.6 (05-27-19 @ 09:45), Max: 98.4 (05-26-19 @ 15:30)  HR: 90 (05-27-19 @ 09:45) (66 - 90)  BP: 112/74 (05-27-19 @ 09:45) (109/68 - 129/69)  BP(mean): 85 (05-26-19 @ 12:45) (82 - 85)  RR: 18 (05-27-19 @ 09:45) (18 - 18)  SpO2: 97% (05-27-19 @ 09:45) (97% - 100%)      Sedation Score:	[X] Alert	[ ] Drowsy	[ ] Arousable  [ ] Asleep  [ ] Unresponsive    Side Effects:	[X] None	[ ] Nausea	[ ] Vomiting  [ ] Pruritus  		[ ] Weakness  [ ] Numbness  [ ] Other:    ASSESSMENT/ PLAN:    Therapy:                         [X] Continue   [ ] Discontinue   [ ] Change to PRN Analgesics    Documentation and Verification of current medications:  [X] Done	[ ] Not done, not eligible, reason:    Comments:

## 2019-05-28 RX ORDER — OXYCODONE HYDROCHLORIDE 5 MG/1
5 TABLET ORAL
Refills: 0 | Status: DISCONTINUED | OUTPATIENT
Start: 2019-05-28 | End: 2019-05-29

## 2019-05-28 RX ORDER — OXYCODONE HYDROCHLORIDE 5 MG/1
5 TABLET ORAL ONCE
Refills: 0 | Status: DISCONTINUED | OUTPATIENT
Start: 2019-05-28 | End: 2019-05-29

## 2019-05-28 RX ORDER — IBUPROFEN 200 MG
600 TABLET ORAL EVERY 6 HOURS
Refills: 0 | Status: DISCONTINUED | OUTPATIENT
Start: 2019-05-28 | End: 2019-05-29

## 2019-05-28 RX ADMIN — Medication 600 MILLIGRAM(S): at 12:13

## 2019-05-28 RX ADMIN — Medication 975 MILLIGRAM(S): at 14:22

## 2019-05-28 RX ADMIN — Medication 975 MILLIGRAM(S): at 15:00

## 2019-05-28 RX ADMIN — HEPARIN SODIUM 5000 UNIT(S): 5000 INJECTION INTRAVENOUS; SUBCUTANEOUS at 21:03

## 2019-05-28 RX ADMIN — Medication 30 MILLIGRAM(S): at 06:41

## 2019-05-28 RX ADMIN — Medication 600 MILLIGRAM(S): at 13:15

## 2019-05-28 RX ADMIN — Medication 975 MILLIGRAM(S): at 08:26

## 2019-05-28 RX ADMIN — Medication 100 MILLIGRAM(S): at 18:25

## 2019-05-28 RX ADMIN — Medication 600 MILLIGRAM(S): at 18:25

## 2019-05-28 RX ADMIN — Medication 975 MILLIGRAM(S): at 09:30

## 2019-05-28 RX ADMIN — Medication 975 MILLIGRAM(S): at 21:30

## 2019-05-28 RX ADMIN — Medication 600 MILLIGRAM(S): at 19:15

## 2019-05-28 RX ADMIN — HEPARIN SODIUM 5000 UNIT(S): 5000 INJECTION INTRAVENOUS; SUBCUTANEOUS at 08:26

## 2019-05-28 RX ADMIN — Medication 600 MILLIGRAM(S): at 23:37

## 2019-05-28 RX ADMIN — Medication 975 MILLIGRAM(S): at 21:03

## 2019-05-28 RX ADMIN — Medication 30 MILLIGRAM(S): at 05:50

## 2019-05-28 RX ADMIN — Medication 30 MILLIGRAM(S): at 00:05

## 2019-05-28 NOTE — PROGRESS NOTE ADULT - SUBJECTIVE AND OBJECTIVE BOX
Day 2 of Anesthesia Pain Management Service    SUBJECTIVE: I'm okay  Pain Scale Score:    [X] Refer to charted pain scores    THERAPY: Epidural Bupivacaine 0.01 % and Fentanyl 3 micrograms/mL     Demand Dose: 3 mL  Lockout: 15 minutes   Continuous Rate:  10 mL    MEDICATIONS  (STANDING):  acetaminophen   Tablet .. 975 milliGRAM(s) Oral every 6 hours  diphtheria/tetanus/pertussis (acellular) Vaccine (ADAcel) 0.5 milliLiter(s) IntraMuscular once  heparin  Injectable 5000 Unit(s) SubCutaneous every 12 hours  ibuprofen  Tablet. 600 milliGRAM(s) Oral every 6 hours  lactated ringers. 1000 milliLiter(s) (125 mL/Hr) IV Continuous <Continuous>  oxytocin Infusion 333.333 milliUNIT(s)/Min (1000 mL/Hr) IV Continuous <Continuous>    MEDICATIONS  (PRN):  diphenhydrAMINE 25 milliGRAM(s) Oral every 6 hours PRN Itching  docusate sodium 100 milliGRAM(s) Oral two times a day PRN Stool softening  glycerin Suppository - Adult 1 Suppository(s) Rectal at bedtime PRN Constipation  lanolin Ointment 1 Application(s) Topical every 6 hours PRN Sore Nipples  magnesium hydroxide Suspension 30 milliLiter(s) Oral two times a day PRN Constipation  oxyCODONE    IR 5 milliGRAM(s) Oral once PRN Severe Pain (7 - 10)  oxyCODONE    IR 5 milliGRAM(s) Oral every 3 hours PRN Moderate Pain (4 - 6)  oxyCODONE    IR 5 milliGRAM(s) Oral once PRN Severe Pain (7 - 10)  simethicone 80 milliGRAM(s) Chew every 4 hours PRN Gas      OBJECTIVE:    Assessment of Epidural Catheter Site: 	    [ ] Dressing intact	[X] Site non-tender	[X] Site without erythema, discharge, edema  [ ] Epidural tubing and connection checked	[X] Gross neurological exam within normal limits  [X] Catheter removed                          9.7    9.78  )-----------( 171      ( 27 May 2019 08:58 )             29.7     Vital Signs Last 24 Hrs  T(C): 36.5 (05-28-19 @ 05:00), Max: 36.9 (05-27-19 @ 16:43)  T(F): 97.7 (05-28-19 @ 05:00), Max: 98.5 (05-27-19 @ 16:43)  HR: 93 (05-28-19 @ 05:00) (76 - 93)  BP: 136/81 (05-28-19 @ 05:00) (112/72 - 136/81)  BP(mean): --  RR: 18 (05-28-19 @ 05:00) (18 - 18)  SpO2: 99% (05-27-19 @ 16:43) (97% - 99%)      Sedation Score:	[X] Alert	[ ] Drowsy	[ ] Arousable  [ ] Asleep  [ ] Unresponsive    Side Effects:	[X] None	[ ] Nausea	[ ] Vomiting  [ ] Pruritus  		[ ] Weakness  [ ] Numbness  [ ] Other:    ASSESSMENT/ PLAN:    Therapy:                         [ ] Continue   [X] Discontinue   [X] Change to PRN Analgesics    Documentation and Verification of current medications:  [X] Done	[ ] Not done, not eligible, reason:    Comments:

## 2019-05-28 NOTE — PROGRESS NOTE ADULT - PROBLEM SELECTOR PLAN 1
Increase OOB  D/C IVF  D/C PCEA  PO Pain Protocol  Continue Regular Diet  Continue Routine Postop/Postpartum Care    Talisha Vasquez PA-C

## 2019-05-28 NOTE — PROGRESS NOTE ADULT - SUBJECTIVE AND OBJECTIVE BOX
Postpartum Note-  Section POD#2    Allergies    Ceclor (Hives)  clarithromycin (Rash)  clindamycin (Hives)  dust (Sneezing)  penicillin (Hives)  walnut (Hives)    Blood type: O  Positive    RPR: Negative    Rubella: Immune    S: Patient is a  29y  P 2002   POD#2 S/P C/Sec  Subjective: Patient w/o complaints, pain is controlled.  Pt is OOB, tolerating PO, passing flatus, and voiding. Lochia WNL.     Feeding: Breast    O:  Vital Signs Last 24 Hrs  T(C): 36.5 (28 May 2019 05:00), Max: 36.9 (27 May 2019 16:43)  T(F): 97.7 (28 May 2019 05:00), Max: 98.5 (27 May 2019 16:43)  HR: 93 (28 May 2019 05:00) (76 - 93)  BP: 136/81 (28 May 2019 05:00) (112/72 - 136/81)  RR: 18 (28 May 2019 05:00) (18 - 18)  SpO2: 99% (27 May 2019 16:43) (97% - 99%)      Gen: NAD  Abdomen: +BS, Soft, nontender, non distended, fundus firm.  Incision: Clean, dry, and intact.  Negative erythema/edema/ecchymosis.   SubQ/Dermabond  Lochia WNL  Ext:  Neg calf tenderness    LABS:                          9.7    9.78  )-----------( 171      ( 27 May 2019 08:58 )             29.7

## 2019-05-28 NOTE — PROGRESS NOTE ADULT - ASSESSMENT
A/P:  29y       S/P  repeat  section    POD # 2, doing well      Current Issues: none  PAST MEDICAL & SURGICAL HISTORY:  Pre-eclampsia  Asthma  PCOS (polycystic ovarian syndrome)  [place] teeth extracted  H/O:

## 2019-05-29 ENCOUNTER — TRANSCRIPTION ENCOUNTER (OUTPATIENT)
Age: 29
End: 2019-05-29

## 2019-05-29 VITALS
TEMPERATURE: 98 F | RESPIRATION RATE: 18 BRPM | DIASTOLIC BLOOD PRESSURE: 60 MMHG | HEART RATE: 76 BPM | SYSTOLIC BLOOD PRESSURE: 91 MMHG

## 2019-05-29 LAB
HCT VFR BLD CALC: 29.9 % — LOW (ref 34.5–45)
HGB BLD-MCNC: 9.6 G/DL — LOW (ref 11.5–15.5)
MCHC RBC-ENTMCNC: 28.5 PG — SIGNIFICANT CHANGE UP (ref 27–34)
MCHC RBC-ENTMCNC: 32.1 GM/DL — SIGNIFICANT CHANGE UP (ref 32–36)
MCV RBC AUTO: 88.7 FL — SIGNIFICANT CHANGE UP (ref 80–100)
PLATELET # BLD AUTO: 206 K/UL — SIGNIFICANT CHANGE UP (ref 150–400)
RBC # BLD: 3.37 M/UL — LOW (ref 3.8–5.2)
RBC # FLD: 14.6 % — HIGH (ref 10.3–14.5)
WBC # BLD: 6.69 K/UL — SIGNIFICANT CHANGE UP (ref 3.8–10.5)
WBC # FLD AUTO: 6.69 K/UL — SIGNIFICANT CHANGE UP (ref 3.8–10.5)

## 2019-05-29 PROCEDURE — 86780 TREPONEMA PALLIDUM: CPT

## 2019-05-29 PROCEDURE — 59025 FETAL NON-STRESS TEST: CPT

## 2019-05-29 PROCEDURE — G0463: CPT

## 2019-05-29 PROCEDURE — 86901 BLOOD TYPING SEROLOGIC RH(D): CPT

## 2019-05-29 PROCEDURE — 85027 COMPLETE CBC AUTOMATED: CPT

## 2019-05-29 PROCEDURE — 59050 FETAL MONITOR W/REPORT: CPT

## 2019-05-29 PROCEDURE — 86900 BLOOD TYPING SEROLOGIC ABO: CPT

## 2019-05-29 PROCEDURE — 86850 RBC ANTIBODY SCREEN: CPT

## 2019-05-29 RX ORDER — ACETAMINOPHEN 500 MG
3 TABLET ORAL
Qty: 0 | Refills: 0 | DISCHARGE
Start: 2019-05-29

## 2019-05-29 RX ORDER — CALCIUM CARBONATE 500(1250)
2 TABLET ORAL
Qty: 0 | Refills: 0 | DISCHARGE

## 2019-05-29 RX ORDER — IBUPROFEN 200 MG
1 TABLET ORAL
Qty: 0 | Refills: 0 | DISCHARGE
Start: 2019-05-29

## 2019-05-29 RX ORDER — ALBUTEROL 90 UG/1
2 AEROSOL, METERED ORAL
Qty: 0 | Refills: 0 | DISCHARGE

## 2019-05-29 RX ADMIN — Medication 975 MILLIGRAM(S): at 04:41

## 2019-05-29 RX ADMIN — Medication 975 MILLIGRAM(S): at 11:50

## 2019-05-29 RX ADMIN — Medication 600 MILLIGRAM(S): at 06:30

## 2019-05-29 RX ADMIN — Medication 975 MILLIGRAM(S): at 12:28

## 2019-05-29 RX ADMIN — Medication 600 MILLIGRAM(S): at 05:58

## 2019-05-29 RX ADMIN — HEPARIN SODIUM 5000 UNIT(S): 5000 INJECTION INTRAVENOUS; SUBCUTANEOUS at 09:24

## 2019-05-29 RX ADMIN — Medication 600 MILLIGRAM(S): at 00:05

## 2019-05-29 RX ADMIN — Medication 975 MILLIGRAM(S): at 05:10

## 2019-05-29 NOTE — DISCHARGE NOTE OB - CARE PLAN
Principal Discharge DX:	 delivery delivered  Goal:	reg diet  Assessment and plan of treatment:	nothing per vagina, activity as tolerated

## 2019-05-29 NOTE — DISCHARGE NOTE OB - PATIENT PORTAL LINK FT
You can access the [facility], offered by [facility], by registering with the following website: [email or web address]

## 2019-05-29 NOTE — PROGRESS NOTE ADULT - ASSESSMENT
A/P:  29y [id number] POD # 3 S/P  repeat   section   Doing well    PMHx: Pre-eclampsia  Asthma  PCOS (polycystic ovarian syndrome)  [place] teeth extracted  H/O:     Current Issues:

## 2019-05-29 NOTE — DISCHARGE NOTE OB - MEDICATION SUMMARY - MEDICATIONS TO TAKE
I will START or STAY ON the medications listed below when I get home from the hospital:    acetaminophen 325 mg oral tablet  -- 3 tab(s) by mouth every 6 hours  -- Indication: For  delivery delivered    ibuprofen 600 mg oral tablet  -- 1 tab(s) by mouth every 6 hours  -- Indication: For  delivery delivered

## 2019-05-29 NOTE — PROGRESS NOTE ADULT - SUBJECTIVE AND OBJECTIVE BOX
Postpartum Note-  Section POD#3    Prenatal Labs  Blood type: O Positive  Rubella IgG: Imm  RPR: Negative          S: Patient w/o complaints, pain is controlled.  Pt is OOB, tolerating PO, passing flatus, voiding. Lochia WNL.     O:  Vital Signs Last 24 Hrs  T(C): 36.9 (29 May 2019 05:56), Max: 37 (28 May 2019 21:04)  T(F): 98.4 (29 May 2019 05:56), Max: 98.6 (28 May 2019 21:04)  HR: 76 (29 May 2019 05:56) (70 - 99)  BP: 91/60 (29 May 2019 05:56) (91/60 - 133/82)  BP(mean): --  RR: 18 (29 May 2019 05:56) (17 - 18)  SpO2: 99% (28 May 2019 21:04) (96% - 100%)     Gen: NAD  Abdomen: Soft, nontender, non-distended, fundus firm.  Incision: Clean/dry/ intact.  No erythema. No ecchymosis   Sub Q  Lochia: WNL  Ext: Neg Calf tenderness    LABS:               9.7    9.78  )-----------( 171      ( 05-27 @ 08:58 )             29.7

## 2019-05-29 NOTE — DISCHARGE NOTE OB - CARE PROVIDER_API CALL
Sarwat Martin (MD)  Obstetrics and Gynecology  [address] [place] [address]  Phone: [phone]  Fax: [phone]  Follow Up Time:

## 2019-09-17 NOTE — DISCHARGE NOTE OB - IF YOU ARE BREASTFEEDING, USE LANOLIN ON NIPPLES AS DIRECTED BY YOUR HEALTHCARE PROVIDER
CONSULTATION



DATE OF CONSULTATION:

09/16/2019



CHIEF COMPLAINT:

Generalized fatigue and lethargy.



The patient is a 50-year-old  female who presented with a history of laryngeal

cancer status post chemo and radiation therapy.  The patient was admitted as an

inpatient for acute hyponatremia with lethargy and generalized fatigue and severe

malnutrition.  She was admitted to the ICU where her hyponatremia was corrected.  She

then became hypoxic and was intubated and she currently is on CPAP following

extubation.  I have been asked to consult on whether any odontogenic infection or

source is causing pulmonary infiltrates that were noted on CT scan.  The patient states

that she has no current dental pain.  She did have a molar removed approximately one

year ago by dentist.  The tooth was removed from the posterior left mandible.  She

states that she has been getting bone chips pushing through over the last several

months.  However, none of her teeth at this time hurt and she has had no episodes of

swelling.



Her labs are significant for an elevated white count of 15.5.



PHYSICAL EXAMINATION:

Physical examination reveals the patient to be resting in bed without utilizing CPAP.

The patient has difficulty talking.  The patient was transferred to nasal cannula while

the examination was performed.  There is no facial swelling or swelling of the neck.

There is no obvious lymphadenopathy.  Intraoral examination reveals the patient to be

edentulous in the maxilla and has only anterior mandibular teeth.  In the area of tooth

#18 the lingual aspect reveals bony spicules.  There is no purulence and there is no

significant adjacent swelling to this region.  The area is mildly tender to palpation.

There is no swelling of the floor of the mouth or in the oropharynx.



ASSESSMENT:

1. Acute metabolic encephalopathy.

2. Acute hyponatremia.

3. Acute respiratory failure.

4. Malnutrition.

5. History of laryngeal cancer, status post chemotherapy and radiation.



PLAN:

There is no dental treatment required at this time.  The bony spicules will continue to

exfoliate on their own.  The patient will follow up with me as an outpatient as needed.

There is no obvious odontogenic source for pulmonary infiltrates or an elevated white

count.





MMODL / IJN: [id number] / Job#: [id number]
CONSULTATION



REASON FOR CONSULT:

Hyponatremia.



HISTORY OF PRESENT ILLNESS:

Patient is a 50-year-old female with a history of laryngeal cancer, maintained on

chemotherapy and radiation therapy.  Patient had not been eating well for the past 1-2

weeks.  Her son brought her to the hospital, as she had decreased mentation.  She also

had some difficulty in breathing at the time of admission.  Patient's mentation was

significantly impaired.  Her sodium was 111.  She was quite lethargic and was

eventually intubated last night.  The patient was initially started on 3% saline.  Her

sodium had increased to 116, after which the 3% saline was discontinued.  Patient

received normal saline for a few hours and then was switched over to D5W.  Her sodium

had gone up to 118, then came down to 117.  D5W was held this morning and patient

received a fluid bolus as well.  Her sodium is up to 122 this morning.  Patient is on

the vent.  There is no ongoing diarrhea noted.  No fevers.  She is quite emaciated.



PAST MEDICAL HISTORY:

Laryngeal cancer, maintained on chemotherapy and radiation therapy.



SURGICAL HISTORY:

Tubal ligation.



SOCIAL HISTORY:

Patient is a former smoker.  No history of drug abuse or alcohol abuse.



MEDICATIONS:

Medications at home included Advil, Mucinex.



ALLERGIES:

NONE.



REVIEW OF SYSTEMS:

As per HPI.  Other systems negative.



PHYSICAL EXAMINATION:

Patient is currently sedated.  She is on the vent.  Blood pressure this morning was

109/71, heart rate 74 per minute.  She is afebrile.

EXAMINATION OF THE HEART: S1 and S2.

EXAMINATION OF LUNGS: Bilateral breath sounds are heard.

ABDOMEN:  Soft, cachectic.

Examination of lower extremities shows no evidence of edema.



LABS:

Sodium 117 this morning, potassium 3.4, BUN 7, serum creatinine 0.16.



ASSESSMENT:

1. Hyponatremia, currently hypovolemic, improving, improved with saline.  The sodium

    had increased rapidly initially; therefore the saline was held and patient was

    maintained on D5W.  This morning it is up to 122.  Currently fluids are off and

    patient will have another sodium checked in about 4 hours.  If she continues to

    increase rapidly, I will need to restart D5W.  Patient is also maintained on tube

    feedings, which will increase the sodium as well.

2. Vent-dependent respiratory failure, mainly acute hypoxic respiratory failure.

3. Severe metabolic encephalopathy.

4. History of laryngeal cancer, status post chemotherapy, radiation therapy; last

    treatment January of 2019.

5. Severe protein-calorie malnutrition.



PLAN:

Discontinue IV fluids.  Repeat sodium in 4 hours.  Check random urine osmolality.

Continue with the tube feedings for now.  Check TSH and cortisol levels as well.



Thank you for this consultation.  Will continue to follow the patient with you during

her hospitalization.





LIZETT / MORENITAN: [id number] / Job#: [id number]
Statement Selected

## 2020-12-16 PROBLEM — Z87.09 HISTORY OF ACUTE SINUSITIS: Status: RESOLVED | Noted: 2018-12-13 | Resolved: 2020-12-16

## 2021-06-22 ENCOUNTER — TRANSCRIPTION ENCOUNTER (OUTPATIENT)
Age: 31
End: 2021-06-22

## 2021-08-21 ENCOUNTER — OUTPATIENT (OUTPATIENT)
Dept: OUTPATIENT SERVICES | Facility: HOSPITAL | Age: 31
LOS: 1 days | End: 2021-08-21
Payer: COMMERCIAL

## 2021-08-21 DIAGNOSIS — Z11.52 ENCOUNTER FOR SCREENING FOR COVID-19: ICD-10-CM

## 2021-08-21 DIAGNOSIS — K08.499 PARTIAL LOSS OF TEETH DUE TO OTHER SPECIFIED CAUSE, UNSPECIFIED CLASS: Chronic | ICD-10-CM

## 2021-08-21 DIAGNOSIS — Z98.89 OTHER SPECIFIED POSTPROCEDURAL STATES: Chronic | ICD-10-CM

## 2021-08-21 LAB — SARS-COV-2 RNA SPEC QL NAA+PROBE: SIGNIFICANT CHANGE UP

## 2021-08-21 PROCEDURE — U0005: CPT

## 2021-08-21 PROCEDURE — U0003: CPT

## 2021-08-21 PROCEDURE — C9803: CPT

## 2021-08-23 ENCOUNTER — TRANSCRIPTION ENCOUNTER (OUTPATIENT)
Age: 31
End: 2021-08-23

## 2021-08-23 ENCOUNTER — OUTPATIENT (OUTPATIENT)
Dept: OUTPATIENT SERVICES | Facility: HOSPITAL | Age: 31
LOS: 1 days | End: 2021-08-23
Payer: COMMERCIAL

## 2021-08-23 VITALS
WEIGHT: 177.03 LBS | TEMPERATURE: 98 F | HEIGHT: 63 IN | HEART RATE: 97 BPM | DIASTOLIC BLOOD PRESSURE: 76 MMHG | OXYGEN SATURATION: 97 % | SYSTOLIC BLOOD PRESSURE: 117 MMHG | RESPIRATION RATE: 16 BRPM

## 2021-08-23 DIAGNOSIS — Z90.49 ACQUIRED ABSENCE OF OTHER SPECIFIED PARTS OF DIGESTIVE TRACT: Chronic | ICD-10-CM

## 2021-08-23 DIAGNOSIS — O02.1 MISSED ABORTION: ICD-10-CM

## 2021-08-23 DIAGNOSIS — K08.499 PARTIAL LOSS OF TEETH DUE TO OTHER SPECIFIED CAUSE, UNSPECIFIED CLASS: Chronic | ICD-10-CM

## 2021-08-23 DIAGNOSIS — Z98.89 OTHER SPECIFIED POSTPROCEDURAL STATES: Chronic | ICD-10-CM

## 2021-08-23 DIAGNOSIS — Z01.818 ENCOUNTER FOR OTHER PREPROCEDURAL EXAMINATION: ICD-10-CM

## 2021-08-23 LAB
ANION GAP SERPL CALC-SCNC: 14 MMOL/L — SIGNIFICANT CHANGE UP (ref 5–17)
BLD GP AB SCN SERPL QL: NEGATIVE — SIGNIFICANT CHANGE UP
BUN SERPL-MCNC: 10 MG/DL — SIGNIFICANT CHANGE UP (ref 7–23)
CALCIUM SERPL-MCNC: 9.7 MG/DL — SIGNIFICANT CHANGE UP (ref 8.4–10.5)
CHLORIDE SERPL-SCNC: 104 MMOL/L — SIGNIFICANT CHANGE UP (ref 96–108)
CO2 SERPL-SCNC: 20 MMOL/L — LOW (ref 22–31)
CREAT SERPL-MCNC: 0.47 MG/DL — LOW (ref 0.5–1.3)
GLUCOSE SERPL-MCNC: 85 MG/DL — SIGNIFICANT CHANGE UP (ref 70–99)
HCT VFR BLD CALC: 35.2 % — SIGNIFICANT CHANGE UP (ref 34.5–45)
HGB BLD-MCNC: 11.9 G/DL — SIGNIFICANT CHANGE UP (ref 11.5–15.5)
MCHC RBC-ENTMCNC: 29 PG — SIGNIFICANT CHANGE UP (ref 27–34)
MCHC RBC-ENTMCNC: 33.8 GM/DL — SIGNIFICANT CHANGE UP (ref 32–36)
MCV RBC AUTO: 85.6 FL — SIGNIFICANT CHANGE UP (ref 80–100)
NRBC # BLD: 0 /100 WBCS — SIGNIFICANT CHANGE UP (ref 0–0)
PLATELET # BLD AUTO: 215 K/UL — SIGNIFICANT CHANGE UP (ref 150–400)
POTASSIUM SERPL-MCNC: 4 MMOL/L — SIGNIFICANT CHANGE UP (ref 3.5–5.3)
POTASSIUM SERPL-SCNC: 4 MMOL/L — SIGNIFICANT CHANGE UP (ref 3.5–5.3)
RBC # BLD: 4.11 M/UL — SIGNIFICANT CHANGE UP (ref 3.8–5.2)
RBC # FLD: 12.6 % — SIGNIFICANT CHANGE UP (ref 10.3–14.5)
RH IG SCN BLD-IMP: POSITIVE — SIGNIFICANT CHANGE UP
SODIUM SERPL-SCNC: 138 MMOL/L — SIGNIFICANT CHANGE UP (ref 135–145)
WBC # BLD: 5.75 K/UL — SIGNIFICANT CHANGE UP (ref 3.8–10.5)
WBC # FLD AUTO: 5.75 K/UL — SIGNIFICANT CHANGE UP (ref 3.8–10.5)

## 2021-08-23 PROCEDURE — 85027 COMPLETE CBC AUTOMATED: CPT

## 2021-08-23 PROCEDURE — G0463: CPT

## 2021-08-23 PROCEDURE — 86900 BLOOD TYPING SEROLOGIC ABO: CPT

## 2021-08-23 PROCEDURE — 86850 RBC ANTIBODY SCREEN: CPT

## 2021-08-23 PROCEDURE — 86901 BLOOD TYPING SEROLOGIC RH(D): CPT

## 2021-08-23 PROCEDURE — 80048 BASIC METABOLIC PNL TOTAL CA: CPT

## 2021-08-23 RX ORDER — SODIUM CHLORIDE 9 MG/ML
1000 INJECTION, SOLUTION INTRAVENOUS
Refills: 0 | Status: DISCONTINUED | OUTPATIENT
Start: 2021-08-24 | End: 2021-09-07

## 2021-08-23 RX ORDER — SODIUM CHLORIDE 9 MG/ML
3 INJECTION INTRAMUSCULAR; INTRAVENOUS; SUBCUTANEOUS EVERY 8 HOURS
Refills: 0 | Status: DISCONTINUED | OUTPATIENT
Start: 2021-08-24 | End: 2021-09-07

## 2021-08-23 RX ORDER — IBUPROFEN 200 MG
400 TABLET ORAL ONCE
Refills: 0 | Status: DISCONTINUED | OUTPATIENT
Start: 2021-08-24 | End: 2021-09-07

## 2021-08-23 RX ORDER — LIDOCAINE HCL 20 MG/ML
0.2 VIAL (ML) INJECTION ONCE
Refills: 0 | Status: DISCONTINUED | OUTPATIENT
Start: 2021-08-24 | End: 2021-09-07

## 2021-08-23 NOTE — H&P PST ADULT - NSICDXPASTMEDICALHX_GEN_ALL_CORE_FT
PAST MEDICAL HISTORY:  Asthma     Missed      Obese     PCOS (polycystic ovarian syndrome)     Pre-eclampsia

## 2021-08-23 NOTE — H&P PST ADULT - ATTENDING COMMENTS
31 p2 here for suction curettage for missed ab sono showed missed ab 9.6 weeks. Risks of procedure discussed. Pt desires  genetic studies

## 2021-08-23 NOTE — H&P PST ADULT - HISTORY OF PRESENT ILLNESS
Pt is a 31y.o. WF  with LMP mid May 2021. Pt states she went for a routine 12 week OB sonogram and there was no fetal heartbeat. Pt denies any pain, cramps or bleeding. She is scheduled for a suction curettage for missed AB with Dr. Ordaz on 21.    Covid swab performed 21 at [facility] [place]- result is negative in HIE  Pt had Moderna Covid vaccine - 2nd dose in 2021

## 2021-08-23 NOTE — H&P PST ADULT - PROBLEM SELECTOR PLAN 1
Pt is scheduled for suction curettage for missed AB  NPO after 11PM   is unvaccinated but will pick patient up DOS

## 2021-08-23 NOTE — H&P PST ADULT - HEALTH CARE MAINTENANCE
Pt had Covid vaccine- 2nd dose of Moderna in Feb 2021  colonoscopy - years ago  Pt usually receives flu vaccine

## 2021-08-24 ENCOUNTER — RESULT REVIEW (OUTPATIENT)
Age: 31
End: 2021-08-24

## 2021-08-24 ENCOUNTER — OUTPATIENT (OUTPATIENT)
Dept: OUTPATIENT SERVICES | Facility: HOSPITAL | Age: 31
LOS: 1 days | End: 2021-08-24
Payer: COMMERCIAL

## 2021-08-24 VITALS
RESPIRATION RATE: 12 BRPM | HEART RATE: 89 BPM | TEMPERATURE: 98 F | SYSTOLIC BLOOD PRESSURE: 119 MMHG | DIASTOLIC BLOOD PRESSURE: 58 MMHG | OXYGEN SATURATION: 100 %

## 2021-08-24 VITALS
OXYGEN SATURATION: 100 % | TEMPERATURE: 98 F | HEART RATE: 76 BPM | SYSTOLIC BLOOD PRESSURE: 100 MMHG | WEIGHT: 177.03 LBS | DIASTOLIC BLOOD PRESSURE: 64 MMHG | RESPIRATION RATE: 17 BRPM | HEIGHT: 63 IN

## 2021-08-24 DIAGNOSIS — O02.1 MISSED ABORTION: ICD-10-CM

## 2021-08-24 DIAGNOSIS — Z98.89 OTHER SPECIFIED POSTPROCEDURAL STATES: Chronic | ICD-10-CM

## 2021-08-24 DIAGNOSIS — Z90.49 ACQUIRED ABSENCE OF OTHER SPECIFIED PARTS OF DIGESTIVE TRACT: Chronic | ICD-10-CM

## 2021-08-24 DIAGNOSIS — K08.499 PARTIAL LOSS OF TEETH DUE TO OTHER SPECIFIED CAUSE, UNSPECIFIED CLASS: Chronic | ICD-10-CM

## 2021-08-24 DIAGNOSIS — Z01.818 ENCOUNTER FOR OTHER PREPROCEDURAL EXAMINATION: ICD-10-CM

## 2021-08-24 PROCEDURE — 88233 TISSUE CULTURE SKIN/BIOPSY: CPT

## 2021-08-24 PROCEDURE — 88305 TISSUE EXAM BY PATHOLOGIST: CPT

## 2021-08-24 PROCEDURE — 88305 TISSUE EXAM BY PATHOLOGIST: CPT | Mod: 26

## 2021-08-24 PROCEDURE — 59820 CARE OF MISCARRIAGE: CPT

## 2021-08-24 PROCEDURE — 88264 CHROMOSOME ANALYSIS 20-25: CPT

## 2021-08-24 PROCEDURE — 88280 CHROMOSOME KARYOTYPE STUDY: CPT

## 2021-08-24 RX ORDER — ASPIRIN/CALCIUM CARB/MAGNESIUM 324 MG
1 TABLET ORAL
Qty: 0 | Refills: 0 | DISCHARGE

## 2021-08-24 RX ORDER — APREPITANT 80 MG/1
40 CAPSULE ORAL ONCE
Refills: 0 | Status: COMPLETED | OUTPATIENT
Start: 2021-08-24 | End: 2021-08-24

## 2021-08-24 RX ADMIN — APREPITANT 40 MILLIGRAM(S): 80 CAPSULE ORAL at 09:29

## 2021-08-24 NOTE — ASU DISCHARGE PLAN (ADULT/PEDIATRIC) - CALL YOUR DOCTOR IF YOU HAVE ANY OF THE FOLLOWING:
Pain not relieved by Medications Bleeding that does not stop/Pain not relieved by Medications/Fever greater than (need to indicate Fahrenheit or Celsius)/Nausea and vomiting that does not stop/Unable to urinate/Excessive diarrhea/Inability to tolerate liquids or foods

## 2021-08-24 NOTE — PRE-ANESTHESIA EVALUATION ADULT - NSANTHOSAYNRD_GEN_A_CORE
No. RADHA screening performed.  STOP BANG Legend: 0-2 = LOW Risk; 3-4 = INTERMEDIATE Risk; 5-8 = HIGH Risk Detail Level: Detailed

## 2021-08-24 NOTE — ASU DISCHARGE PLAN (ADULT/PEDIATRIC) - CARE PROVIDER_API CALL
Lizette Ordaz (MD)  Obstetrics and Gynecology  [address], [address]  [address]  [place] [id number]  Phone: [phone]  Fax: [phone]  Follow Up Time: 2 weeks

## 2021-08-24 NOTE — ASU PREOP CHECKLIST - AICD PRESENT
Vaccine Information Statement(s) was given today. This has been reviewed, questions answered, and verbal consent given by Patient for injection(s) and administration of Influenza (Inactivated).    1. Does the patient have a moderate to severe fever?  No  2. Has the patient had a serious reaction to a flu shot before?   No  3. Has the patient ever had Guillian Aspen Syndrome within 6 weeks of a previous flu shot?  No  4. Is the patient less that 6 months of age?  No    Patient is eligible to receive the vaccine based on all questions being answered as 'No'.    Patient tolerated without incident. See immunization grid for documentation.       no

## 2021-08-24 NOTE — BRIEF OPERATIVE NOTE - NSICDXBRIEFPROCEDURE_GEN_ALL_CORE_FT
PROCEDURES:  Dilation and curettage, uterus, using suction, not resulting in fetal demise 24-Aug-2021 11:36:00  Lizette Ordaz

## 2021-08-31 LAB — SURGICAL PATHOLOGY STUDY: SIGNIFICANT CHANGE UP

## 2021-09-15 LAB — CHROM ANALY OVERALL INTERP SPEC-IMP: SIGNIFICANT CHANGE UP

## 2021-12-13 ENCOUNTER — TRANSCRIPTION ENCOUNTER (OUTPATIENT)
Age: 31
End: 2021-12-13

## 2021-12-14 PROBLEM — E66.9 OBESITY, UNSPECIFIED: Chronic | Status: ACTIVE | Noted: 2021-08-23

## 2021-12-14 PROBLEM — O02.1 MISSED ABORTION: Chronic | Status: ACTIVE | Noted: 2021-08-23

## 2021-12-18 ENCOUNTER — APPOINTMENT (OUTPATIENT)
Dept: DISASTER EMERGENCY | Facility: HOSPITAL | Age: 31
End: 2021-12-18

## 2024-02-08 ENCOUNTER — NON-APPOINTMENT (OUTPATIENT)
Age: 34
End: 2024-02-08

## 2024-02-08 ENCOUNTER — APPOINTMENT (OUTPATIENT)
Dept: CARDIOLOGY | Facility: CLINIC | Age: 34
End: 2024-02-08
Payer: COMMERCIAL

## 2024-02-08 VITALS
SYSTOLIC BLOOD PRESSURE: 113 MMHG | HEART RATE: 81 BPM | HEIGHT: 63 IN | OXYGEN SATURATION: 100 % | BODY MASS INDEX: 32.96 KG/M2 | DIASTOLIC BLOOD PRESSURE: 76 MMHG | WEIGHT: 186 LBS

## 2024-02-08 DIAGNOSIS — R03.0 ELEVATED BLOOD-PRESSURE READING, W/OUT DIAGNOSIS OF HYPERTENSION: ICD-10-CM

## 2024-02-08 PROCEDURE — 93000 ELECTROCARDIOGRAM COMPLETE: CPT

## 2024-02-08 PROCEDURE — 99204 OFFICE O/P NEW MOD 45 MIN: CPT | Mod: 25

## 2024-02-08 RX ORDER — AZITHROMYCIN 250 MG/1
250 TABLET, FILM COATED ORAL
Qty: 1 | Refills: 0 | Status: COMPLETED | COMMUNITY
Start: 2018-12-13 | End: 2024-02-08

## 2024-02-08 RX ORDER — MULTIVIT-MIN/FOLIC/VIT K/LYCOP 400-300MCG
50 MCG TABLET ORAL
Qty: 30 | Refills: 12 | Status: DISCONTINUED | COMMUNITY
Start: 2018-05-30 | End: 2024-02-08

## 2024-02-08 RX ORDER — PRENATAL VIT NO.130/IRON/FOLIC 27MG-0.8MG
TABLET ORAL
Refills: 0 | Status: ACTIVE | COMMUNITY

## 2024-02-08 RX ORDER — FLUTICASONE PROPIONATE 50 UG/1
50 SPRAY, METERED NASAL
Qty: 1 | Refills: 1 | Status: COMPLETED | COMMUNITY
Start: 2018-12-13 | End: 2024-02-08

## 2024-02-08 NOTE — DISCUSSION/SUMMARY
[FreeTextEntry1] : Her blood pressure is certainly quite a bit better here than it was in her OB/GYN's office recently.  For right now, I will defer any antihypertensive medication.  If medication is necessary, given coexisting palpitations and the possibility of a tachycardia issue, the use of labetalol might be appropriate.  For now, I have suggested an echocardiogram as well as a 1 week extended Holter.  She will schedule this set of examinations, and I will be in contact with her to discuss the results.  She will let me know if her blood pressure warrants treatment based on measurements outside this office, and she will plan to see me in the office again in about 6 weeks.  [EKG obtained to assist in diagnosis and management of assessed problem(s)] : EKG obtained to assist in diagnosis and management of assessed problem(s)

## 2024-02-08 NOTE — HISTORY OF PRESENT ILLNESS
[FreeTextEntry1] : Apple presented to the office today for a cardiovascular evaluation.  She presents for the further evaluation of elevated blood pressures and palpitations, in the context of pregnancy.  She is now 34 years old, without a history of hypertension, diabetes or hypercholesterolemia.  With her first child about 7 years ago, she developed preeclampsia, with about 3 months of mildly elevated blood pressures after delivery.  She did not require antihypertensive medication.  She had palpitations at that time, and was given a diagnosis of "tachycardia ", which was not treated with any AV robby blockers, seemingly.  She is not known to have any underlying structural heart disease.  At baseline, she tries to stay physically active.  With regular physical activities, she feels well, without reproducible chest discomfort or shortness of breath suggestive of angina.  She denies orthopnea, PND and lower extremity edema.  She denies dizziness and syncope.  She is now 13 weeks pregnant, and has begun to experience intermittent palpitations.  She describes a few seconds, which often will be experienced when she wakes up in the middle of the night.  She feels that her heart is accelerated, often with an abrupt decrease in heart rate.  Her sensation of tachycardia seems to terminate after deep breaths.  This may not happen every day, but seems to happen more days than not.  In addition, she went to her OB/GYN, and was told that her blood pressure was 130/100.  She was told to see the cardiologist.

## 2024-02-21 ENCOUNTER — APPOINTMENT (OUTPATIENT)
Dept: CARDIOLOGY | Facility: CLINIC | Age: 34
End: 2024-02-21
Payer: COMMERCIAL

## 2024-02-21 PROCEDURE — 93306 TTE W/DOPPLER COMPLETE: CPT

## 2024-03-27 ENCOUNTER — APPOINTMENT (OUTPATIENT)
Dept: CARDIOLOGY | Facility: CLINIC | Age: 34
End: 2024-03-27
Payer: COMMERCIAL

## 2024-03-27 ENCOUNTER — NON-APPOINTMENT (OUTPATIENT)
Age: 34
End: 2024-03-27

## 2024-03-27 VITALS
BODY MASS INDEX: 33.66 KG/M2 | WEIGHT: 190 LBS | OXYGEN SATURATION: 100 % | HEIGHT: 63 IN | DIASTOLIC BLOOD PRESSURE: 79 MMHG | HEART RATE: 81 BPM | SYSTOLIC BLOOD PRESSURE: 117 MMHG

## 2024-03-27 DIAGNOSIS — R00.2 PALPITATIONS: ICD-10-CM

## 2024-03-27 PROCEDURE — 99214 OFFICE O/P EST MOD 30 MIN: CPT

## 2024-03-27 PROCEDURE — 93000 ELECTROCARDIOGRAM COMPLETE: CPT

## 2024-03-27 PROCEDURE — G2211 COMPLEX E/M VISIT ADD ON: CPT

## 2024-03-28 NOTE — CARDIOLOGY SUMMARY
[de-identified] : February 8, 2024 normal sinus rhythm 3/27/24 sinus rhythm [de-identified] : 3/2024  6 day  5% PVC burden  sinus rhythm sinus tachycardia [de-identified] : 2/21/24 LVEF 58% NML LV/RV NML diastolic function  trace TR

## 2024-03-28 NOTE — HISTORY OF PRESENT ILLNESS
[FreeTextEntry1] : Apple presented to the office today for a cardiovascular evaluation.  She presents for the further evaluation of elevated blood pressures and palpitations, in the context of pregnancy.   She is now 34 years old, without a history of hypertension, diabetes or hypercholesterolemia.  With her first child about 7 years ago, she developed preeclampsia, with about 3 months of mildly elevated blood pressures after delivery.  She did not require antihypertensive medication.  She had palpitations at that time, and was given a diagnosis of "tachycardia ", which was not treated with any AV robby blockers, seemingly.  She is not known to have any underlying structural heart disease.  At baseline, she tries to stay physically active.  With regular physical activities, she feels well, without reproducible chest discomfort or shortness of breath suggestive of angina.  She denies orthopnea, PND and lower extremity edema.  She denies dizziness and syncope.   At 13 weeks pregnant, and had begun to experiencing intermittent palpitations.  She describes a few seconds, which often will be experienced when she wakes up in the middle of the night.  She feels that her heart is accelerated, often with an abrupt decrease in heart rate.  Her sensation of tachycardia seems to terminate after deep breaths.  This may not happen every day, but seems to happen more days than not.  In addition, she went to her OB/GYN, and was told that her blood pressure was 130/100.  She was told to see the cardiologist.   She returns now at 20 weeks gestation. S/P 7 day ambulatory monitoring that demonstrated patient triggered event revealing sinus rhythm , sinus tachycardia.  5% PVC burden was noted, though asymptomatic.   since last visit, her palpitations have subsided as well as her blood pressure improved. She reports readings no higher than 120 systolic. An echocardiogram was also performed since last visit demonstrating a normal LVEF with out significant valvular or wall motion abnormalities. musculoskeletal

## 2024-03-28 NOTE — DISCUSSION/SUMMARY
[FreeTextEntry1] : Ms Gunter arrives for follow-up. Prior visit history as noted above. She arrives in no acute distress.  She does not report symptoms c/w HF or unstable arrhythmia. ECG illustrates sinus with unifocal PVCs. Her blood pressure is normal. I reviewed her echocardiogram which demonstrates normal LV function without significant valvular or wall motion abnormalities. I also reviewed her ambulatory rhythm monitor which illustrated sinus rhythm, sinus tachycardia for her patient triggered events. 5% PVC burden was noted.  She does have PVCs on today's ECG of which she is asymptomatic.  Because her burden is <20% with a normal LV function and she is asymptomatic, will defer any medical management for suppression at this time.  She has an appointment with [facility] tomorrow and is due for blood work, will follow-up electrolytes.      she will keep an eye on her blood pressure at home.  Limit her dietary salt intake and remain hydrated.  She will follow up with me around 34 weeks, sooner with any questions or concerns.

## 2024-03-28 NOTE — ADDENDUM
[FreeTextEntry1] : hx pre-eclampsia palps for which had monitor 5% pvcs, asx bp controlled, not on meds visit 3 months or if bp elevates

## 2024-03-28 NOTE — PHYSICAL EXAM
[Well Developed] : well developed [No Acute Distress] : no acute distress [Well Nourished] : well nourished [Normal Venous Pressure] : normal venous pressure [Normal Conjunctiva] : normal conjunctiva [No Carotid Bruit] : no carotid bruit [Normal S1, S2] : normal S1, S2 [No Murmur] : no murmur [No Rub] : no rub [No Gallop] : no gallop [Clear Lung Fields] : clear lung fields [Good Air Entry] : good air entry [No Respiratory Distress] : no respiratory distress  [Normal Gait] : normal gait [Normal Bowel Sounds] : normal bowel sounds [No Cyanosis] : no cyanosis [No Edema] : no edema [No Varicosities] : no varicosities [No Clubbing] : no clubbing [No Rash] : no rash [Moves all extremities] : moves all extremities [No Skin Lesions] : no skin lesions [No Focal Deficits] : no focal deficits [Normal Speech] : normal speech [Normal memory] : normal memory [Alert and Oriented] : alert and oriented [de-identified] : Gravid

## 2024-06-19 ENCOUNTER — APPOINTMENT (OUTPATIENT)
Dept: CARDIOLOGY | Facility: CLINIC | Age: 34
End: 2024-06-19
Payer: COMMERCIAL

## 2024-06-19 ENCOUNTER — NON-APPOINTMENT (OUTPATIENT)
Age: 34
End: 2024-06-19

## 2024-06-19 VITALS
BODY MASS INDEX: 35.44 KG/M2 | WEIGHT: 200 LBS | OXYGEN SATURATION: 99 % | DIASTOLIC BLOOD PRESSURE: 76 MMHG | HEIGHT: 63 IN | SYSTOLIC BLOOD PRESSURE: 117 MMHG | HEART RATE: 83 BPM

## 2024-06-19 DIAGNOSIS — I49.3 VENTRICULAR PREMATURE DEPOLARIZATION: ICD-10-CM

## 2024-06-19 PROCEDURE — 99214 OFFICE O/P EST MOD 30 MIN: CPT | Mod: 25

## 2024-06-19 PROCEDURE — G2211 COMPLEX E/M VISIT ADD ON: CPT | Mod: NC

## 2024-06-19 PROCEDURE — 93000 ELECTROCARDIOGRAM COMPLETE: CPT

## 2024-06-19 NOTE — CARDIOLOGY SUMMARY
[de-identified] : February 8, 2024 normal sinus rhythm 3/27/24 sinus rhythm June 19, 2024 normal sinus rhythm [de-identified] : 3/2024  6 day  5% PVC burden  sinus rhythm sinus tachycardia [de-identified] : 2/21/24 LVEF 58% NML LV/RV NML diastolic function  trace TR

## 2024-06-19 NOTE — DISCUSSION/SUMMARY
[FreeTextEntry1] : Ms Gunter arrives for follow-up. Prior visit history as noted above. She arrives in no acute distress.  She does not report symptoms c/w HF or unstable arrhythmia. ECG illustrates sinus with unifocal PVCs. Her blood pressure is normal. I reviewed her echocardiogram which demonstrates normal LV function without significant valvular or wall motion abnormalities. I also reviewed her ambulatory rhythm monitor which illustrated sinus rhythm, sinus tachycardia for her patient triggered events. 5% PVC burden was noted.  She does have PVCs on today's ECG of which she is asymptomatic.  The risk of frequent PVCs, at this relatively low burden, is low, and I think that rather than insert any unknowns into her late stage of pregnancy, it is best to be conservative at this point.   She will plan to see me in about 3 months, about 1 month after she delivers. [EKG obtained to assist in diagnosis and management of assessed problem(s)] : EKG obtained to assist in diagnosis and management of assessed problem(s)

## 2024-06-19 NOTE — HISTORY OF PRESENT ILLNESS
[FreeTextEntry1] : Apple presented to the office today for a cardiovascular evaluation.  She was last seen in the office about 3 months ago.  She is now 34 years old, without a history of hypertension, diabetes or hypercholesterolemia.  With her first child about 7 years ago, she developed preeclampsia, with about 3 months of mildly elevated blood pressures after delivery.  She did not require antihypertensive medication.  She had palpitations at that time, and was given a diagnosis of "tachycardia ", which was not treated with any AV robby blockers, seemingly.  She is not known to have any underlying structural heart disease.  At baseline, she tries to stay physically active.  With regular physical activities, she feels well, without reproducible chest discomfort or shortness of breath suggestive of angina.  She denies orthopnea, PND and lower extremity edema.  She denies dizziness and syncope.   At 13 weeks pregnant, and had begun to experiencing intermittent palpitations.  She described a few seconds, which often will be experienced when she wakes up in the middle of the night.  She would feel that her heart is accelerated, often with an abrupt decrease in heart rate.  Her sensation of tachycardia seemed to terminate after deep breaths.  This may not happen every day, but seemed to happen more days than not.  In addition, she went to her OB/GYN, and was told that her blood pressure was 130/100.  She was told to see the cardiologist.   We saw her in the office in March 2024, and she was feeling well overall.  We reviewed her test results.  Echocardiography was performed February 21, 2024.  This revealed a normal ejection fraction, without significant valvular disease.  An extended Holter monitor was performed in March 2024.  This revealed an overall sinus rhythm, with a 5% burden of PVCs, asymptomatic.  She was asked to return 3 months later.  She presents to the office today having been feeling well.  She reports no chest discomfort or shortness of breath suggestive of angina.  She denies orthopnea, PND and lower extremity edema.  She denies palpitations, dizziness and syncope.  She reports that her blood pressure and cholesterol have been well controlled. She is scheduled to deliver in August.

## 2024-06-19 NOTE — PHYSICAL EXAM
[Well Developed] : well developed [Well Nourished] : well nourished [No Acute Distress] : no acute distress [Normal Conjunctiva] : normal conjunctiva [Normal Venous Pressure] : normal venous pressure [No Carotid Bruit] : no carotid bruit [Normal S1, S2] : normal S1, S2 [No Murmur] : no murmur [No Rub] : no rub [No Gallop] : no gallop [Clear Lung Fields] : clear lung fields [Good Air Entry] : good air entry [No Respiratory Distress] : no respiratory distress  [Normal Bowel Sounds] : normal bowel sounds [Normal Gait] : normal gait [No Edema] : no edema [No Cyanosis] : no cyanosis [No Clubbing] : no clubbing [No Varicosities] : no varicosities [No Rash] : no rash [No Skin Lesions] : no skin lesions [Moves all extremities] : moves all extremities [No Focal Deficits] : no focal deficits [Normal Speech] : normal speech [Alert and Oriented] : alert and oriented [Normal memory] : normal memory [de-identified] : Gravid

## 2024-06-19 NOTE — REVIEW OF SYSTEMS
[Dizziness] : dizziness [Negative] : Heme/Lymph [de-identified] : occasional in relation to position

## 2024-07-24 ENCOUNTER — TRANSCRIPTION ENCOUNTER (OUTPATIENT)
Age: 34
End: 2024-07-24

## 2024-07-25 ENCOUNTER — INPATIENT (INPATIENT)
Facility: HOSPITAL | Age: 34
LOS: 1 days | Discharge: HOME | DRG: 951 | End: 2024-07-27
Attending: OBSTETRICS & GYNECOLOGY | Admitting: OBSTETRICS & GYNECOLOGY
Payer: COMMERCIAL

## 2024-07-25 VITALS — OXYGEN SATURATION: 100 %

## 2024-07-25 DIAGNOSIS — K08.499 PARTIAL LOSS OF TEETH DUE TO OTHER SPECIFIED CAUSE, UNSPECIFIED CLASS: Chronic | ICD-10-CM

## 2024-07-25 DIAGNOSIS — Z90.49 ACQUIRED ABSENCE OF OTHER SPECIFIED PARTS OF DIGESTIVE TRACT: Chronic | ICD-10-CM

## 2024-07-25 DIAGNOSIS — Z98.89 OTHER SPECIFIED POSTPROCEDURAL STATES: Chronic | ICD-10-CM

## 2024-07-25 DIAGNOSIS — O26.899 OTHER SPECIFIED PREGNANCY RELATED CONDITIONS, UNSPECIFIED TRIMESTER: ICD-10-CM

## 2024-07-25 DIAGNOSIS — Z34.80 ENCOUNTER FOR SUPERVISION OF OTHER NORMAL PREGNANCY, UNSPECIFIED TRIMESTER: ICD-10-CM

## 2024-07-25 LAB
ALBUMIN SERPL ELPH-MCNC: 3.5 G/DL — SIGNIFICANT CHANGE UP (ref 3.3–5)
ALP SERPL-CCNC: 236 U/L — HIGH (ref 40–120)
ALT FLD-CCNC: 18 U/L — SIGNIFICANT CHANGE UP (ref 10–45)
ANION GAP SERPL CALC-SCNC: 14 MMOL/L — SIGNIFICANT CHANGE UP (ref 5–17)
APPEARANCE UR: CLEAR — SIGNIFICANT CHANGE UP
AST SERPL-CCNC: 25 U/L — SIGNIFICANT CHANGE UP (ref 10–40)
BASOPHILS # BLD AUTO: 0.02 K/UL — SIGNIFICANT CHANGE UP (ref 0–0.2)
BASOPHILS NFR BLD AUTO: 0.3 % — SIGNIFICANT CHANGE UP (ref 0–2)
BILIRUB SERPL-MCNC: 0.9 MG/DL — SIGNIFICANT CHANGE UP (ref 0.2–1.2)
BILIRUB UR-MCNC: NEGATIVE — SIGNIFICANT CHANGE UP
BUN SERPL-MCNC: 6 MG/DL — LOW (ref 7–23)
CALCIUM SERPL-MCNC: 9.6 MG/DL — SIGNIFICANT CHANGE UP (ref 8.4–10.5)
CHLORIDE SERPL-SCNC: 106 MMOL/L — SIGNIFICANT CHANGE UP (ref 96–108)
CO2 SERPL-SCNC: 20 MMOL/L — LOW (ref 22–31)
COLOR SPEC: YELLOW — SIGNIFICANT CHANGE UP
CREAT ?TM UR-MCNC: 31 MG/DL — SIGNIFICANT CHANGE UP
CREAT SERPL-MCNC: 0.52 MG/DL — SIGNIFICANT CHANGE UP (ref 0.5–1.3)
DIFF PNL FLD: NEGATIVE — SIGNIFICANT CHANGE UP
EGFR: 123 ML/MIN/1.73M2 — SIGNIFICANT CHANGE UP
EGFR: 123 ML/MIN/1.73M2 — SIGNIFICANT CHANGE UP
EOSINOPHIL # BLD AUTO: 0.05 K/UL — SIGNIFICANT CHANGE UP (ref 0–0.5)
EOSINOPHIL NFR BLD AUTO: 0.6 % — SIGNIFICANT CHANGE UP (ref 0–6)
GLUCOSE SERPL-MCNC: 76 MG/DL — SIGNIFICANT CHANGE UP (ref 70–99)
GLUCOSE UR QL: NEGATIVE MG/DL — SIGNIFICANT CHANGE UP
HCT VFR BLD CALC: 34.8 % — SIGNIFICANT CHANGE UP (ref 34.5–45)
HGB BLD-MCNC: 11.7 G/DL — SIGNIFICANT CHANGE UP (ref 11.5–15.5)
IMM GRANULOCYTES NFR BLD AUTO: 2 % — HIGH (ref 0–0.9)
KETONES UR-MCNC: NEGATIVE MG/DL — SIGNIFICANT CHANGE UP
LDH SERPL L TO P-CCNC: 235 U/L — SIGNIFICANT CHANGE UP (ref 50–242)
LEUKOCYTE ESTERASE UR-ACNC: NEGATIVE — SIGNIFICANT CHANGE UP
LYMPHOCYTES # BLD AUTO: 1.76 K/UL — SIGNIFICANT CHANGE UP (ref 1–3.3)
LYMPHOCYTES # BLD AUTO: 22.1 % — SIGNIFICANT CHANGE UP (ref 13–44)
MCHC RBC-ENTMCNC: 29 PG — SIGNIFICANT CHANGE UP (ref 27–34)
MCHC RBC-ENTMCNC: 33.6 GM/DL — SIGNIFICANT CHANGE UP (ref 32–36)
MCV RBC AUTO: 86.4 FL — SIGNIFICANT CHANGE UP (ref 80–100)
MONOCYTES # BLD AUTO: 0.43 K/UL — SIGNIFICANT CHANGE UP (ref 0–0.9)
MONOCYTES NFR BLD AUTO: 5.4 % — SIGNIFICANT CHANGE UP (ref 2–14)
NEUTROPHILS # BLD AUTO: 5.56 K/UL — SIGNIFICANT CHANGE UP (ref 1.8–7.4)
NEUTROPHILS NFR BLD AUTO: 69.6 % — SIGNIFICANT CHANGE UP (ref 43–77)
NITRITE UR-MCNC: NEGATIVE — SIGNIFICANT CHANGE UP
NRBC # BLD: 0 /100 WBCS — SIGNIFICANT CHANGE UP (ref 0–0)
NRBC BLD-RTO: 0 /100 WBCS — SIGNIFICANT CHANGE UP (ref 0–0)
PH UR: 7 — SIGNIFICANT CHANGE UP (ref 5–8)
PLATELET # BLD AUTO: 172 K/UL — SIGNIFICANT CHANGE UP (ref 150–400)
POTASSIUM SERPL-MCNC: 4 MMOL/L — SIGNIFICANT CHANGE UP (ref 3.5–5.3)
POTASSIUM SERPL-SCNC: 4 MMOL/L — SIGNIFICANT CHANGE UP (ref 3.5–5.3)
PROT ?TM UR-MCNC: <7 MG/DL — SIGNIFICANT CHANGE UP (ref 0–12)
PROT SERPL-MCNC: 6.4 G/DL — SIGNIFICANT CHANGE UP (ref 6–8.3)
PROT UR-MCNC: NEGATIVE MG/DL — SIGNIFICANT CHANGE UP
PROT/CREAT UR-RTO: SIGNIFICANT CHANGE UP (ref 0–0.2)
RBC # BLD: 4.03 M/UL — SIGNIFICANT CHANGE UP (ref 3.8–5.2)
RBC # FLD: 13.8 % — SIGNIFICANT CHANGE UP (ref 10.3–14.5)
SODIUM SERPL-SCNC: 140 MMOL/L — SIGNIFICANT CHANGE UP (ref 135–145)
SP GR SPEC: 1.01 — SIGNIFICANT CHANGE UP (ref 1–1.03)
URATE SERPL-MCNC: 4.8 MG/DL — SIGNIFICANT CHANGE UP (ref 2.5–7)
UROBILINOGEN FLD QL: 0.2 MG/DL — SIGNIFICANT CHANGE UP (ref 0.2–1)
WBC # BLD: 7.98 K/UL — SIGNIFICANT CHANGE UP (ref 3.8–10.5)
WBC # FLD AUTO: 7.98 K/UL — SIGNIFICANT CHANGE UP (ref 3.8–10.5)

## 2024-07-25 PROCEDURE — 88302 TISSUE EXAM BY PATHOLOGIST: CPT | Mod: 26

## 2024-07-25 RX ORDER — CITRIC ACID/SODIUM CITRATE 300-500 MG
30 SOLUTION, ORAL ORAL ONCE
Refills: 0 | Status: DISCONTINUED | OUTPATIENT
Start: 2024-07-25 | End: 2024-07-25

## 2024-07-25 RX ORDER — OXYCODONE HYDROCHLORIDE 30 MG/1
5 TABLET ORAL
Refills: 0 | Status: DISCONTINUED | OUTPATIENT
Start: 2024-07-25 | End: 2024-07-27

## 2024-07-25 RX ORDER — SODIUM CHLORIDE 9 G/1000ML
1000 INJECTION, SOLUTION INTRAVENOUS
Refills: 0 | Status: DISCONTINUED | OUTPATIENT
Start: 2024-07-25 | End: 2024-07-25

## 2024-07-25 RX ORDER — NALBUPHINE HYDROCHLORIDE 10 MG/ML
2.5 INJECTION INTRAMUSCULAR; INTRAVENOUS; SUBCUTANEOUS EVERY 6 HOURS
Refills: 0 | Status: DISCONTINUED | OUTPATIENT
Start: 2024-07-25 | End: 2024-07-27

## 2024-07-25 RX ORDER — IBUPROFEN 200 MG
600 TABLET ORAL EVERY 6 HOURS
Refills: 0 | Status: COMPLETED | OUTPATIENT
Start: 2024-07-25 | End: 2025-06-23

## 2024-07-25 RX ORDER — HEPARIN SODIUM 1000 [USP'U]/ML
5000 INJECTION INTRAVENOUS; SUBCUTANEOUS EVERY 12 HOURS
Refills: 0 | Status: DISCONTINUED | OUTPATIENT
Start: 2024-07-25 | End: 2024-07-27

## 2024-07-25 RX ORDER — OXYTOCIN-SODIUM CHLORIDE 0.9% IV SOLN 30 UNIT/500ML 30-0.9/5 UT/ML-%
333.33 SOLUTION INTRAVENOUS
Qty: 20 | Refills: 0 | Status: DISCONTINUED | OUTPATIENT
Start: 2024-07-25 | End: 2024-07-27

## 2024-07-25 RX ORDER — KETOROLAC TROMETHAMINE 30 MG/ML
30 INJECTION, SOLUTION INTRAMUSCULAR; INTRAVENOUS EVERY 6 HOURS
Refills: 0 | Status: DISCONTINUED | OUTPATIENT
Start: 2024-07-25 | End: 2024-07-26

## 2024-07-25 RX ORDER — OXYCODONE HYDROCHLORIDE 30 MG/1
10 TABLET ORAL
Refills: 0 | Status: DISCONTINUED | OUTPATIENT
Start: 2024-07-25 | End: 2024-07-25

## 2024-07-25 RX ORDER — SIMETHICONE 80 MG
80 TABLET,CHEWABLE ORAL EVERY 4 HOURS
Refills: 0 | Status: DISCONTINUED | OUTPATIENT
Start: 2024-07-25 | End: 2024-07-27

## 2024-07-25 RX ORDER — NALOXONE HYDROCHLORIDE 0.4 MG/ML
0.1 INJECTION, SOLUTION INTRAMUSCULAR; INTRAVENOUS; SUBCUTANEOUS
Refills: 0 | Status: DISCONTINUED | OUTPATIENT
Start: 2024-07-25 | End: 2024-07-27

## 2024-07-25 RX ORDER — ACETAMINOPHEN 500 MG/5ML
975 LIQUID (ML) ORAL
Refills: 0 | Status: DISCONTINUED | OUTPATIENT
Start: 2024-07-25 | End: 2024-07-27

## 2024-07-25 RX ORDER — MODIFIED LANOLIN 100 %
1 CREAM (GRAM) TOPICAL EVERY 6 HOURS
Refills: 0 | Status: DISCONTINUED | OUTPATIENT
Start: 2024-07-25 | End: 2024-07-27

## 2024-07-25 RX ORDER — DIPHENHYDRAMINE HCL 12.5MG/5ML
25 ELIXIR ORAL EVERY 6 HOURS
Refills: 0 | Status: DISCONTINUED | OUTPATIENT
Start: 2024-07-25 | End: 2024-07-27

## 2024-07-25 RX ORDER — MAGNESIUM HYDROXIDE 400 MG/5ML
30 SUSPENSION ORAL
Refills: 0 | Status: DISCONTINUED | OUTPATIENT
Start: 2024-07-25 | End: 2024-07-27

## 2024-07-25 RX ORDER — OXYCODONE HYDROCHLORIDE 30 MG/1
5 TABLET ORAL ONCE
Refills: 0 | Status: DISCONTINUED | OUTPATIENT
Start: 2024-07-25 | End: 2024-07-27

## 2024-07-25 RX ORDER — DEXAMETHASONE 0.5 MG/1
4 TABLET ORAL EVERY 6 HOURS
Refills: 0 | Status: DISCONTINUED | OUTPATIENT
Start: 2024-07-25 | End: 2024-07-27

## 2024-07-25 RX ORDER — SODIUM CHLORIDE 9 G/1000ML
1000 INJECTION, SOLUTION INTRAVENOUS
Refills: 0 | Status: DISCONTINUED | OUTPATIENT
Start: 2024-07-25 | End: 2024-07-27

## 2024-07-25 RX ORDER — ACETAMINOPHEN 500 MG/5ML
650 LIQUID (ML) ORAL ONCE
Refills: 0 | Status: COMPLETED | OUTPATIENT
Start: 2024-07-25 | End: 2024-07-25

## 2024-07-25 RX ORDER — ONDANSETRON HCL/PF 4 MG/2 ML
4 VIAL (ML) INJECTION EVERY 6 HOURS
Refills: 0 | Status: DISCONTINUED | OUTPATIENT
Start: 2024-07-25 | End: 2024-07-27

## 2024-07-25 RX ORDER — OXYTOCIN-SODIUM CHLORIDE 0.9% IV SOLN 30 UNIT/500ML 30-0.9/5 UT/ML-%
333.33 SOLUTION INTRAVENOUS
Qty: 20 | Refills: 0 | Status: DISCONTINUED | OUTPATIENT
Start: 2024-07-25 | End: 2024-07-25

## 2024-07-25 RX ORDER — OXYCODONE HYDROCHLORIDE 30 MG/1
5 TABLET ORAL
Refills: 0 | Status: DISCONTINUED | OUTPATIENT
Start: 2024-07-25 | End: 2024-07-25

## 2024-07-25 RX ADMIN — Medication 650 MILLIGRAM(S): at 18:54

## 2024-07-26 ENCOUNTER — RESULT REVIEW (OUTPATIENT)
Age: 34
End: 2024-07-26

## 2024-07-26 LAB
BASOPHILS # BLD AUTO: 0.02 K/UL — SIGNIFICANT CHANGE UP (ref 0–0.2)
BASOPHILS NFR BLD AUTO: 0.2 % — SIGNIFICANT CHANGE UP (ref 0–2)
EOSINOPHIL # BLD AUTO: 0 K/UL — SIGNIFICANT CHANGE UP (ref 0–0.5)
EOSINOPHIL NFR BLD AUTO: 0 % — SIGNIFICANT CHANGE UP (ref 0–6)
HCT VFR BLD CALC: 31.2 % — LOW (ref 34.5–45)
HGB BLD-MCNC: 10.6 G/DL — LOW (ref 11.5–15.5)
IMM GRANULOCYTES NFR BLD AUTO: 1 % — HIGH (ref 0–0.9)
LYMPHOCYTES # BLD AUTO: 1.09 K/UL — SIGNIFICANT CHANGE UP (ref 1–3.3)
LYMPHOCYTES # BLD AUTO: 10.1 % — LOW (ref 13–44)
MCHC RBC-ENTMCNC: 30 PG — SIGNIFICANT CHANGE UP (ref 27–34)
MCHC RBC-ENTMCNC: 34 GM/DL — SIGNIFICANT CHANGE UP (ref 32–36)
MCV RBC AUTO: 88.4 FL — SIGNIFICANT CHANGE UP (ref 80–100)
MONOCYTES # BLD AUTO: 0.55 K/UL — SIGNIFICANT CHANGE UP (ref 0–0.9)
MONOCYTES NFR BLD AUTO: 5.1 % — SIGNIFICANT CHANGE UP (ref 2–14)
NEUTROPHILS # BLD AUTO: 8.97 K/UL — HIGH (ref 1.8–7.4)
NEUTROPHILS NFR BLD AUTO: 83.6 % — HIGH (ref 43–77)
NRBC # BLD: 0 /100 WBCS — SIGNIFICANT CHANGE UP (ref 0–0)
NRBC BLD-RTO: 0 /100 WBCS — SIGNIFICANT CHANGE UP (ref 0–0)
PLATELET # BLD AUTO: 158 K/UL — SIGNIFICANT CHANGE UP (ref 150–400)
RBC # BLD: 3.53 M/UL — LOW (ref 3.8–5.2)
RBC # FLD: 13.9 % — SIGNIFICANT CHANGE UP (ref 10.3–14.5)
T PALLIDUM AB TITR SER: NEGATIVE — SIGNIFICANT CHANGE UP
WBC # BLD: 10.74 K/UL — HIGH (ref 3.8–10.5)
WBC # FLD AUTO: 10.74 K/UL — HIGH (ref 3.8–10.5)

## 2024-07-26 PROCEDURE — 93306 TTE W/DOPPLER COMPLETE: CPT | Mod: 26

## 2024-07-26 PROCEDURE — 93356 MYOCRD STRAIN IMG SPCKL TRCK: CPT

## 2024-07-26 RX ORDER — IBUPROFEN 200 MG
600 TABLET ORAL EVERY 6 HOURS
Refills: 0 | Status: DISCONTINUED | OUTPATIENT
Start: 2024-07-26 | End: 2024-07-27

## 2024-07-26 RX ADMIN — KETOROLAC TROMETHAMINE 30 MILLIGRAM(S): 30 INJECTION, SOLUTION INTRAMUSCULAR; INTRAVENOUS at 18:15

## 2024-07-26 RX ADMIN — KETOROLAC TROMETHAMINE 30 MILLIGRAM(S): 30 INJECTION, SOLUTION INTRAMUSCULAR; INTRAVENOUS at 17:15

## 2024-07-26 RX ADMIN — Medication 975 MILLIGRAM(S): at 21:15

## 2024-07-26 RX ADMIN — Medication 975 MILLIGRAM(S): at 16:04

## 2024-07-26 RX ADMIN — Medication 975 MILLIGRAM(S): at 14:52

## 2024-07-26 RX ADMIN — KETOROLAC TROMETHAMINE 30 MILLIGRAM(S): 30 INJECTION, SOLUTION INTRAMUSCULAR; INTRAVENOUS at 11:46

## 2024-07-26 RX ADMIN — HEPARIN SODIUM 5000 UNIT(S): 1000 INJECTION INTRAVENOUS; SUBCUTANEOUS at 06:01

## 2024-07-26 RX ADMIN — HEPARIN SODIUM 5000 UNIT(S): 1000 INJECTION INTRAVENOUS; SUBCUTANEOUS at 17:15

## 2024-07-26 RX ADMIN — KETOROLAC TROMETHAMINE 30 MILLIGRAM(S): 30 INJECTION, SOLUTION INTRAMUSCULAR; INTRAVENOUS at 06:01

## 2024-07-26 RX ADMIN — Medication 975 MILLIGRAM(S): at 10:45

## 2024-07-26 RX ADMIN — Medication 600 MILLIGRAM(S): at 23:38

## 2024-07-26 RX ADMIN — Medication 975 MILLIGRAM(S): at 20:11

## 2024-07-26 RX ADMIN — KETOROLAC TROMETHAMINE 30 MILLIGRAM(S): 30 INJECTION, SOLUTION INTRAMUSCULAR; INTRAVENOUS at 13:01

## 2024-07-26 RX ADMIN — Medication 975 MILLIGRAM(S): at 09:15

## 2024-07-26 RX ADMIN — KETOROLAC TROMETHAMINE 30 MILLIGRAM(S): 30 INJECTION, SOLUTION INTRAMUSCULAR; INTRAVENOUS at 06:31

## 2024-07-27 ENCOUNTER — TRANSCRIPTION ENCOUNTER (OUTPATIENT)
Age: 34
End: 2024-07-27

## 2024-07-27 VITALS
OXYGEN SATURATION: 98 % | DIASTOLIC BLOOD PRESSURE: 80 MMHG | TEMPERATURE: 98 F | HEART RATE: 76 BPM | SYSTOLIC BLOOD PRESSURE: 132 MMHG | RESPIRATION RATE: 18 BRPM

## 2024-07-27 PROCEDURE — 84156 ASSAY OF PROTEIN URINE: CPT

## 2024-07-27 PROCEDURE — 59050 FETAL MONITOR W/REPORT: CPT

## 2024-07-27 PROCEDURE — 93010 ELECTROCARDIOGRAM REPORT: CPT

## 2024-07-27 PROCEDURE — C1889: CPT

## 2024-07-27 PROCEDURE — 86900 BLOOD TYPING SEROLOGIC ABO: CPT

## 2024-07-27 PROCEDURE — 93306 TTE W/DOPPLER COMPLETE: CPT

## 2024-07-27 PROCEDURE — 86901 BLOOD TYPING SEROLOGIC RH(D): CPT

## 2024-07-27 PROCEDURE — 86780 TREPONEMA PALLIDUM: CPT

## 2024-07-27 PROCEDURE — 59025 FETAL NON-STRESS TEST: CPT

## 2024-07-27 PROCEDURE — 81003 URINALYSIS AUTO W/O SCOPE: CPT

## 2024-07-27 PROCEDURE — 83615 LACTATE (LD) (LDH) ENZYME: CPT

## 2024-07-27 PROCEDURE — 84550 ASSAY OF BLOOD/URIC ACID: CPT

## 2024-07-27 PROCEDURE — 93356 MYOCRD STRAIN IMG SPCKL TRCK: CPT

## 2024-07-27 PROCEDURE — 85025 COMPLETE CBC W/AUTO DIFF WBC: CPT

## 2024-07-27 PROCEDURE — 80053 COMPREHEN METABOLIC PANEL: CPT

## 2024-07-27 PROCEDURE — 82570 ASSAY OF URINE CREATININE: CPT

## 2024-07-27 PROCEDURE — 88302 TISSUE EXAM BY PATHOLOGIST: CPT

## 2024-07-27 PROCEDURE — 93005 ELECTROCARDIOGRAM TRACING: CPT

## 2024-07-27 PROCEDURE — 86850 RBC ANTIBODY SCREEN: CPT

## 2024-07-27 RX ORDER — IBUPROFEN 200 MG
1 TABLET ORAL
Qty: 0 | Refills: 0 | DISCHARGE
Start: 2024-07-27

## 2024-07-27 RX ORDER — ACETAMINOPHEN 500 MG/5ML
3 LIQUID (ML) ORAL
Qty: 0 | Refills: 0 | DISCHARGE
Start: 2024-07-27

## 2024-07-27 RX ADMIN — HEPARIN SODIUM 5000 UNIT(S): 1000 INJECTION INTRAVENOUS; SUBCUTANEOUS at 18:18

## 2024-07-27 RX ADMIN — Medication 600 MILLIGRAM(S): at 18:55

## 2024-07-27 RX ADMIN — Medication 975 MILLIGRAM(S): at 08:58

## 2024-07-27 RX ADMIN — Medication 600 MILLIGRAM(S): at 06:10

## 2024-07-27 RX ADMIN — Medication 975 MILLIGRAM(S): at 16:20

## 2024-07-27 RX ADMIN — Medication 600 MILLIGRAM(S): at 12:43

## 2024-07-27 RX ADMIN — Medication 975 MILLIGRAM(S): at 15:59

## 2024-07-27 RX ADMIN — HEPARIN SODIUM 5000 UNIT(S): 1000 INJECTION INTRAVENOUS; SUBCUTANEOUS at 06:10

## 2024-07-27 RX ADMIN — Medication 600 MILLIGRAM(S): at 12:13

## 2024-07-27 RX ADMIN — Medication 600 MILLIGRAM(S): at 00:58

## 2024-07-27 RX ADMIN — Medication 975 MILLIGRAM(S): at 20:42

## 2024-07-27 RX ADMIN — Medication 600 MILLIGRAM(S): at 18:19

## 2024-07-27 RX ADMIN — Medication 975 MILLIGRAM(S): at 04:10

## 2024-07-27 RX ADMIN — Medication 975 MILLIGRAM(S): at 03:03

## 2024-07-27 RX ADMIN — Medication 975 MILLIGRAM(S): at 09:28

## 2024-07-29 ENCOUNTER — NON-APPOINTMENT (OUTPATIENT)
Age: 34
End: 2024-07-29

## 2024-07-29 ENCOUNTER — APPOINTMENT (OUTPATIENT)
Dept: CARDIOLOGY | Facility: CLINIC | Age: 34
End: 2024-07-29
Payer: COMMERCIAL

## 2024-07-29 VITALS
OXYGEN SATURATION: 98 % | DIASTOLIC BLOOD PRESSURE: 111 MMHG | HEIGHT: 63 IN | HEART RATE: 94 BPM | BODY MASS INDEX: 33.66 KG/M2 | WEIGHT: 190 LBS | SYSTOLIC BLOOD PRESSURE: 159 MMHG

## 2024-07-29 DIAGNOSIS — I49.3 VENTRICULAR PREMATURE DEPOLARIZATION: ICD-10-CM

## 2024-07-29 PROCEDURE — 93000 ELECTROCARDIOGRAM COMPLETE: CPT

## 2024-07-29 PROCEDURE — 99215 OFFICE O/P EST HI 40 MIN: CPT | Mod: 25

## 2024-07-29 PROCEDURE — G2211 COMPLEX E/M VISIT ADD ON: CPT | Mod: NC

## 2024-07-29 NOTE — DISCUSSION/SUMMARY
[FreeTextEntry1] : Ms Gunter arrives for follow-up. Prior visit history as noted above. She arrives in no acute distress.  She does not report symptoms c/w HF or unstable arrhythmia. ECG illustrates sinus with frequent PVCs. Her blood pressure is elevated, but upon repeat, it is considerably better.  Her diastolic is normal, but her systolic remains elevated.  This may be multifactorial, and to an extent reactive. I reviewed her prior outpatient echo, as well as her more recent inpatient echo from July 26, 2024. I also reviewed her ambulatory rhythm monitor which illustrated sinus rhythm, sinus tachycardia for her patient triggered events. 5% PVC burden was noted.  She does have PVCs on today's ECG of which she is asymptomatic.    It is unclear what the nature of her cardiomyopathy is, whether it is peripartum, related to PVCs, or something else.  Is unclear whether she is in fact hypertensive, as has been the case previously, or whether she has reactive hypertension, exaggerated by pain and anxiety.  It is unclear whether her PVCs are a symptom of the cardiomyopathy, or perhaps a cause.  For now, I have asked that she check her blood pressure tonight and tomorrow, and if elevated, we will need to start therapy.  I would lean toward a beta-blocker which will need to be discussed with her obstetrics team, and which would be prescribed with care, noting a history of asthma.  I do however think that given her cardiomyopathy, and PVCs, a beta-blocker is most appropriate.  We will be in contact over the next several days to discuss her blood pressure, and she will see me in the office again next week, with an echocardiogram planned to be repeated on the same day. [EKG obtained to assist in diagnosis and management of assessed problem(s)] : EKG obtained to assist in diagnosis and management of assessed problem(s)

## 2024-07-29 NOTE — HISTORY OF PRESENT ILLNESS
[FreeTextEntry1] : Apple presented to the office today for a cardiovascular evaluation.  She was last seen in the office about 6 weeks ago.  She is now 34 years old, without a history of hypertension, diabetes or hypercholesterolemia.  With her first child about 7 years ago, she developed preeclampsia, with about 3 months of mildly elevated blood pressures after delivery.  She did not require antihypertensive medication.  She had palpitations at that time, and was given a diagnosis of "tachycardia ", which was not treated with any AV robby blockers, seemingly.  She is not known to have any underlying structural heart disease.  At baseline, she tries to stay physically active.  With regular physical activities, she feels well, without reproducible chest discomfort or shortness of breath suggestive of angina.  She denies orthopnea, PND and lower extremity edema.  She denies dizziness and syncope.   At 13 weeks pregnant, she developed intermittent palpitations.  She described a few seconds, which often would be experienced when she wakes up in the middle of the night.  She would feel that her heart is accelerated, often with an abrupt decrease in heart rate.  Her sensation of tachycardia seemed to terminate after deep breaths.  This may not happen every day, but seemed to happen more days than not.  In addition, she went to her OB/GYN, and was told that her blood pressure was 130/100.  She was told to see the cardiologist.   We saw her in the office in 2024, and she was feeling well overall.  We reviewed her test results.  Echocardiography was performed 2024.  This revealed a normal ejection fraction, without significant valvular disease.  An extended Holter monitor was performed in 2024.  This revealed an overall sinus rhythm, with a 5% burden of PVCs, asymptomatic.  She was asked to return 3 months later.  I saw her in , and she was feeling well at that time. She was admitted to [facility] between  and 2024, at 37 weeks gestation due to elevated blood pressures at presurgical testing.  She went on to have a urgent low-transverse  section.  Her course at that time was notable for the observation that she had somewhat frequent PVCs.  An echocardiogram was performed, revealing an ejection fraction which was mildly depressed, at 49%.  Additional evaluations were being considered, but she ended up leaving AGAINST MEDICAL ADVICE, and making an appointment to see me today.  She does not report any symptoms suggestive of angina, or heart failure.  She does occasionally feel her heart rate is accelerated, but this is quite brief, and well-tolerated.

## 2024-07-29 NOTE — REVIEW OF SYSTEMS
[Dizziness] : dizziness [Negative] : Heme/Lymph [de-identified] : occasional in relation to position

## 2024-07-29 NOTE — CARDIOLOGY SUMMARY
[de-identified] : February 8, 2024 normal sinus rhythm 3/27/24 sinus rhythm June 19, 2024 normal sinus rhythm July 29, 2024 normal sinus rhythm frequent PVCs [de-identified] : 3/2024  6 day  5% PVC burden  sinus rhythm sinus tachycardia [de-identified] : 2/21/24 LVEF 58% NML LV/RV NML diastolic function  trace TR

## 2024-07-29 NOTE — PHYSICAL EXAM
[Well Developed] : well developed [Well Nourished] : well nourished [No Acute Distress] : no acute distress [Normal Conjunctiva] : normal conjunctiva [Normal Venous Pressure] : normal venous pressure [No Carotid Bruit] : no carotid bruit [Normal S1, S2] : normal S1, S2 [No Murmur] : no murmur [No Rub] : no rub [No Gallop] : no gallop [Clear Lung Fields] : clear lung fields [Good Air Entry] : good air entry [No Respiratory Distress] : no respiratory distress  [Normal Bowel Sounds] : normal bowel sounds [Normal Gait] : normal gait [No Edema] : no edema [No Cyanosis] : no cyanosis [No Clubbing] : no clubbing [No Varicosities] : no varicosities [No Rash] : no rash [No Skin Lesions] : no skin lesions [Moves all extremities] : moves all extremities [No Focal Deficits] : no focal deficits [Normal Speech] : normal speech [Alert and Oriented] : alert and oriented [Normal memory] : normal memory [de-identified] : Gravid

## 2024-07-30 LAB — SURGICAL PATHOLOGY STUDY: SIGNIFICANT CHANGE UP

## 2024-08-06 ENCOUNTER — NON-APPOINTMENT (OUTPATIENT)
Age: 34
End: 2024-08-06

## 2024-08-06 ENCOUNTER — APPOINTMENT (OUTPATIENT)
Dept: CARDIOLOGY | Facility: CLINIC | Age: 34
End: 2024-08-06

## 2024-08-06 PROCEDURE — 93000 ELECTROCARDIOGRAM COMPLETE: CPT

## 2024-08-06 PROCEDURE — G2211 COMPLEX E/M VISIT ADD ON: CPT | Mod: NC

## 2024-08-06 PROCEDURE — 99214 OFFICE O/P EST MOD 30 MIN: CPT | Mod: 25

## 2024-08-06 PROCEDURE — 93306 TTE W/DOPPLER COMPLETE: CPT

## 2024-08-06 RX ORDER — LABETALOL HYDROCHLORIDE 200 MG/1
200 TABLET, FILM COATED ORAL
Qty: 180 | Refills: 3 | Status: DISCONTINUED | COMMUNITY
Start: 2024-07-29 | End: 2024-08-06

## 2024-08-06 NOTE — PHYSICAL EXAM
[Well Developed] : well developed [Well Nourished] : well nourished [No Acute Distress] : no acute distress [Normal Conjunctiva] : normal conjunctiva [Normal Venous Pressure] : normal venous pressure [No Carotid Bruit] : no carotid bruit [Normal S1, S2] : normal S1, S2 [No Murmur] : no murmur [No Rub] : no rub [No Gallop] : no gallop [Clear Lung Fields] : clear lung fields [Good Air Entry] : good air entry [No Respiratory Distress] : no respiratory distress  [Normal Bowel Sounds] : normal bowel sounds [Normal Gait] : normal gait [No Edema] : no edema [No Cyanosis] : no cyanosis [No Clubbing] : no clubbing [No Varicosities] : no varicosities [No Rash] : no rash [No Skin Lesions] : no skin lesions [Moves all extremities] : moves all extremities [No Focal Deficits] : no focal deficits [Normal Speech] : normal speech [Alert and Oriented] : alert and oriented [Normal memory] : normal memory [de-identified] : Gravid

## 2024-08-06 NOTE — HISTORY OF PRESENT ILLNESS
[FreeTextEntry1] : Apple presented to the office today for a cardiovascular evaluation.  She was last seen in the office about 1 week ago.  She is now 34 years old, without a history of hypertension, diabetes or hypercholesterolemia.  With her first child about 7 years ago, she developed preeclampsia, with about 3 months of mildly elevated blood pressures after delivery.  She did not require antihypertensive medication.  She had palpitations at that time, and was given a diagnosis of "tachycardia ", which was not treated with any AV robby blockers, seemingly.  She is not known to have any underlying structural heart disease.  At baseline, she tries to stay physically active.  With regular physical activities, she feels well, without reproducible chest discomfort or shortness of breath suggestive of angina.  She denies orthopnea, PND and lower extremity edema.  She denies dizziness and syncope.   At 13 weeks pregnant, she developed intermittent palpitations.  She described a few seconds, which often would be experienced when she wakes up in the middle of the night.  She would feel that her heart is accelerated, often with an abrupt decrease in heart rate.  Her sensation of tachycardia seemed to terminate after deep breaths.  This may not happen every day, but seemed to happen more days than not.  In addition, she went to her OB/GYN, and was told that her blood pressure was 130/100.  She was told to see the cardiologist.   We saw her in the office in 2024, and she was feeling well overall.  We reviewed her test results.  Echocardiography was performed 2024.  This revealed a normal ejection fraction, without significant valvular disease.  An extended Holter monitor was performed in 2024.  This revealed an overall sinus rhythm, with a 5% burden of PVCs, asymptomatic.  She was asked to return 3 months later.  I saw her in , and she was feeling well at that time. She was admitted to [facility] between  and 2024, at 37 weeks gestation due to elevated blood pressures at presurgical testing.  She went on to have a urgent low-transverse  section.  Her course at that time was notable for the observation that she had somewhat frequent PVCs.  An echocardiogram was performed, revealing an ejection fraction which was mildly depressed, at 49%.  Additional evaluations were being considered, but she ended up leaving AGAINST MEDICAL ADVICE, and making an appointment to see me.  When I saw her, it was unclear if she was in fact hypertensive, or had reactive hypertension exaggerated by pain and anxiety.  It was unclear whether her PVCs were a symptom of cardiomyopathy, or perhaps a cause.  I asked that she check her blood pressure, and I left open the possibility of starting labetalol.  We discussed repeating her echocardiogram along with a visit, in 1 week's time.  She does not report any symptoms suggestive of angina, or heart failure.  What ever edema she had postpartum has resolved.  She is unaware of any irregularities in the heartbeat.  Her blood pressure at home has typically been in the range of 130-140/80-90.

## 2024-08-06 NOTE — CARDIOLOGY SUMMARY
[de-identified] : February 8, 2024 normal sinus rhythm 3/27/24 sinus rhythm June 19, 2024 normal sinus rhythm July 29, 2024 normal sinus rhythm frequent PVCs August 6, 2024 sinus rhythm  [de-identified] : 3/2024  6 day  5% PVC burden  sinus rhythm sinus tachycardia [de-identified] : 2/21/24 LVEF 58% NML LV/RV NML diastolic function  trace TR

## 2024-08-06 NOTE — DISCUSSION/SUMMARY
[FreeTextEntry1] : Ms Gunter arrives for follow-up. Prior visit history as noted above. She arrives in no acute distress.  She does not report symptoms c/w HF or unstable arrhythmia.  Although her EKG last week revealed a sinus rhythm with frequent PVCs, and she furthermore had PVCs on examination, she does not have any PVCs on EKG, exam, or on the telemetry monitoring for her echo.  Her blood pressure is elevated.  My review of her echocardiogram reveals that she continues to exhibit mild LV dysfunction.  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  I do not think this is related to the PVCs because only.  I suspect that that PVCs that she had were more a reflection of her cardiomyopathy, rather than a precipitant.  She will start carvedilol 3.125 mg twice daily.  She will discuss this with her pediatrician.  I suspect that there is a low risk with ongoing breast-feeding.  She will give me a call if there are any questions or concerns.  Have suggested a follow-up in about 2 weeks.    [EKG obtained to assist in diagnosis and management of assessed problem(s)] : EKG obtained to assist in diagnosis and management of assessed problem(s)

## 2024-08-06 NOTE — REVIEW OF SYSTEMS
[Dizziness] : dizziness [Negative] : Heme/Lymph [de-identified] : occasional in relation to position

## 2024-08-23 ENCOUNTER — NON-APPOINTMENT (OUTPATIENT)
Age: 34
End: 2024-08-23

## 2024-08-23 ENCOUNTER — APPOINTMENT (OUTPATIENT)
Dept: CARDIOLOGY | Facility: CLINIC | Age: 34
End: 2024-08-23
Payer: COMMERCIAL

## 2024-08-23 VITALS
OXYGEN SATURATION: 98 % | DIASTOLIC BLOOD PRESSURE: 78 MMHG | SYSTOLIC BLOOD PRESSURE: 115 MMHG | HEART RATE: 67 BPM | HEIGHT: 63 IN

## 2024-08-23 PROCEDURE — 93000 ELECTROCARDIOGRAM COMPLETE: CPT

## 2024-08-23 PROCEDURE — 99214 OFFICE O/P EST MOD 30 MIN: CPT | Mod: 25

## 2024-08-23 PROCEDURE — G2211 COMPLEX E/M VISIT ADD ON: CPT | Mod: NC

## 2024-08-23 RX ORDER — METOPROLOL SUCCINATE 25 MG/1
25 TABLET, EXTENDED RELEASE ORAL
Qty: 30 | Refills: 3 | Status: ACTIVE | COMMUNITY
Start: 2024-08-23 | End: 1900-01-01

## 2024-08-25 NOTE — HISTORY OF PRESENT ILLNESS
[FreeTextEntry1] : Apple presented to the office today for a cardiovascular evaluation.  She was last seen in the office about 1 week ago.  She is now 34 years old, without a history of hypertension, diabetes or hypercholesterolemia.  With her first child about 7 years ago, she developed preeclampsia, with about 3 months of mildly elevated blood pressures after delivery.  She did not require antihypertensive medication.  She had palpitations at that time, and was given a diagnosis of "tachycardia ", which was not treated with any AV robby blockers, seemingly.  She is not known to have any underlying structural heart disease.  At baseline, she tries to stay physically active.  With regular physical activities, she feels well, without reproducible chest discomfort or shortness of breath suggestive of angina.  She denies orthopnea, PND and lower extremity edema.  She denies dizziness and syncope.   At 13 weeks pregnant, she developed intermittent palpitations.  She described a few seconds, which often would be experienced when she wakes up in the middle of the night.  She would feel that her heart is accelerated, often with an abrupt decrease in heart rate.  Her sensation of tachycardia seemed to terminate after deep breaths.  This may not happen every day, but seemed to happen more days than not.  In addition, she went to her OB/GYN, and was told that her blood pressure was 130/100.  She was told to see the cardiologist.   We saw her in the office in 2024, and she was feeling well overall.  We reviewed her test results.  Echocardiography was performed 2024.  This revealed a normal ejection fraction, without significant valvular disease.  An extended Holter monitor was performed in 2024.  This revealed an overall sinus rhythm, with a 5% burden of PVCs, asymptomatic.  She was asked to return 3 months later.  I saw her in , and she was feeling well at that time. She was admitted to [facility] between  and 2024, at 37 weeks gestation due to elevated blood pressures at presurgical testing.  She went on to have a urgent low-transverse  section.  Her course at that time was notable for the observation that she had somewhat frequent PVCs.  An echocardiogram was performed, revealing an ejection fraction which was mildly depressed, at 49%.  Additional evaluations were being considered, but she ended up leaving AGAINST MEDICAL ADVICE, and making an appointment to see me.  When I saw her, it was unclear if she was in fact hypertensive, or had reactive hypertension exaggerated by pain and anxiety.  It was unclear whether her PVCs were a symptom of cardiomyopathy, or perhaps a cause.  I asked that she check her blood pressure, and I left open the possibility of starting labetalol.  repeat echocardiogram was performed after last office visit demonstrating LVEF 45-50%.  She does not report any symptoms suggestive of angina, or heart failure.  What ever edema she had postpartum has resolved.  She is unaware of any irregularities in the heartbeat.  she has not been checking her blood pressure at home.  She has experienced occasional symptoms of feeling light headed 'feeling off" primarily in the mornings. She is now on metoprolol tartrate instead of carvedilol due to breast feeding.  she is hoping to travel to [facility] in October.
[FreeTextEntry1] : Apple presented to the office today for a cardiovascular evaluation.  She was last seen in the office about 1 week ago.  She is now 34 years old, without a history of hypertension, diabetes or hypercholesterolemia.  With her first child about 7 years ago, she developed preeclampsia, with about 3 months of mildly elevated blood pressures after delivery.  She did not require antihypertensive medication.  She had palpitations at that time, and was given a diagnosis of "tachycardia ", which was not treated with any AV robby blockers, seemingly.  She is not known to have any underlying structural heart disease.  At baseline, she tries to stay physically active.  With regular physical activities, she feels well, without reproducible chest discomfort or shortness of breath suggestive of angina.  She denies orthopnea, PND and lower extremity edema.  She denies dizziness and syncope.   At 13 weeks pregnant, she developed intermittent palpitations.  She described a few seconds, which often would be experienced when she wakes up in the middle of the night.  She would feel that her heart is accelerated, often with an abrupt decrease in heart rate.  Her sensation of tachycardia seemed to terminate after deep breaths.  This may not happen every day, but seemed to happen more days than not.  In addition, she went to her OB/GYN, and was told that her blood pressure was 130/100.  She was told to see the cardiologist.   We saw her in the office in 2024, and she was feeling well overall.  We reviewed her test results.  Echocardiography was performed 2024.  This revealed a normal ejection fraction, without significant valvular disease.  An extended Holter monitor was performed in 2024.  This revealed an overall sinus rhythm, with a 5% burden of PVCs, asymptomatic.  She was asked to return 3 months later.  I saw her in , and she was feeling well at that time. She was admitted to [facility] between  and 2024, at 37 weeks gestation due to elevated blood pressures at presurgical testing.  She went on to have a urgent low-transverse  section.  Her course at that time was notable for the observation that she had somewhat frequent PVCs.  An echocardiogram was performed, revealing an ejection fraction which was mildly depressed, at 49%.  Additional evaluations were being considered, but she ended up leaving AGAINST MEDICAL ADVICE, and making an appointment to see me.  When I saw her, it was unclear if she was in fact hypertensive, or had reactive hypertension exaggerated by pain and anxiety.  It was unclear whether her PVCs were a symptom of cardiomyopathy, or perhaps a cause.  I asked that she check her blood pressure, and I left open the possibility of starting labetalol.  repeat echocardiogram was performed after last office visit demonstrating LVEF 45-50%.  She does not report any symptoms suggestive of angina, or heart failure.  What ever edema she had postpartum has resolved.  She is unaware of any irregularities in the heartbeat.  she has not been checking her blood pressure at home.  She has experienced occasional symptoms of feeling light headed 'feeling off" primarily in the mornings. She is now on metoprolol tartrate instead of carvedilol due to breast feeding.  she is hoping to travel to [facility] in October.
Yes

## 2024-08-25 NOTE — REVIEW OF SYSTEMS
[Dizziness] : dizziness [Negative] : Heme/Lymph [de-identified] : occasional in relation to position

## 2024-08-25 NOTE — REVIEW OF SYSTEMS
[Dizziness] : dizziness [Negative] : Heme/Lymph [de-identified] : occasional in relation to position

## 2024-08-25 NOTE — CARDIOLOGY SUMMARY
[de-identified] : February 8, 2024 normal sinus rhythm 3/27/24 sinus rhythm June 19, 2024 normal sinus rhythm July 29, 2024 normal sinus rhythm frequent PVCs August 6, 2024 sinus rhythm  8/23/24: sinus bradycardia  [de-identified] : 3/2024  6 day  5% PVC burden  sinus rhythm sinus tachycardia [de-identified] : 8/7/24 -post partum-LVEF 45-50%, Global hypokinesis, mod LAE, mild MR, grade 1 diastolic  2/21/24 LVEF 58% NML LV/RV NML diastolic function  trace TR

## 2024-08-25 NOTE — CARDIOLOGY SUMMARY
[de-identified] : February 8, 2024 normal sinus rhythm 3/27/24 sinus rhythm June 19, 2024 normal sinus rhythm July 29, 2024 normal sinus rhythm frequent PVCs August 6, 2024 sinus rhythm  8/23/24: sinus bradycardia  [de-identified] : 3/2024  6 day  5% PVC burden  sinus rhythm sinus tachycardia [de-identified] : 8/7/24 -post partum-LVEF 45-50%, Global hypokinesis, mod LAE, mild MR, grade 1 diastolic  2/21/24 LVEF 58% NML LV/RV NML diastolic function  trace TR

## 2024-08-25 NOTE — DISCUSSION/SUMMARY
[EKG obtained to assist in diagnosis and management of assessed problem(s)] : EKG obtained to assist in diagnosis and management of assessed problem(s) [FreeTextEntry1] : Ms Gunter arrives for follow-up. Prior visit history as noted above. She arrives in no acute distress.  She does not report symptoms c/w HF or unstable arrhythmia.   She is now just short of one month post partum. ECG illustrates sinus bradycardia w/out ectopic beats, B/P is controlled.  My review of her echocardiogram from 8/7/24  continues to exhibit mild LV dysfunction-global hypokinesis, LEF 45-50%.   We discussed the importance in management of estephania partum cardiomyopathy.  I do not think her cardiomyopathy is related to the PVCs, but suspect the PVCs were more a reflection of her cardiomyopathy, rather than a precipitant.  To optimize her management, taking into account that she is still breast feeding, she will switch metoprolol to succinate and reduce dose to 25mg daily.  We will repeat an echocardiogram in 2 weeks to reassess her LV function.  She will follow up again in office, sooner as needed.

## 2024-08-25 NOTE — PHYSICAL EXAM
[Well Developed] : well developed [Well Nourished] : well nourished [No Acute Distress] : no acute distress [Normal Conjunctiva] : normal conjunctiva [Normal Venous Pressure] : normal venous pressure [No Carotid Bruit] : no carotid bruit [Normal S1, S2] : normal S1, S2 [No Rub] : no rub [No Gallop] : no gallop [Clear Lung Fields] : clear lung fields [Good Air Entry] : good air entry [No Respiratory Distress] : no respiratory distress  [Normal Bowel Sounds] : normal bowel sounds [Normal Gait] : normal gait [No Edema] : no edema [No Cyanosis] : no cyanosis [No Clubbing] : no clubbing [No Varicosities] : no varicosities [No Rash] : no rash [No Skin Lesions] : no skin lesions [Moves all extremities] : moves all extremities [No Focal Deficits] : no focal deficits [Normal Speech] : normal speech [Alert and Oriented] : alert and oriented [Normal memory] : normal memory [Rhythm Regular] : regular [Normal S1] : normal S1 [Normal S2] : normal S2 [No Murmur] : no murmurs heard [No Pitting Edema] : no pitting edema present [No Abnormalities] : the abdominal aorta was not enlarged and no bruit was heard [Right Carotid Bruit] : no bruit heard over the right carotid [Left Carotid Bruit] : no bruit heard over the left carotid [de-identified] : post partum

## 2024-08-25 NOTE — PHYSICAL EXAM
[Well Developed] : well developed [Well Nourished] : well nourished [No Acute Distress] : no acute distress [Normal Conjunctiva] : normal conjunctiva [Normal Venous Pressure] : normal venous pressure [No Carotid Bruit] : no carotid bruit [Normal S1, S2] : normal S1, S2 [No Rub] : no rub [No Gallop] : no gallop [Clear Lung Fields] : clear lung fields [Good Air Entry] : good air entry [No Respiratory Distress] : no respiratory distress  [Normal Bowel Sounds] : normal bowel sounds [Normal Gait] : normal gait [No Edema] : no edema [No Cyanosis] : no cyanosis [No Clubbing] : no clubbing [No Varicosities] : no varicosities [No Rash] : no rash [No Skin Lesions] : no skin lesions [Moves all extremities] : moves all extremities [No Focal Deficits] : no focal deficits [Normal Speech] : normal speech [Alert and Oriented] : alert and oriented [Normal memory] : normal memory [Rhythm Regular] : regular [Normal S1] : normal S1 [Normal S2] : normal S2 [No Murmur] : no murmurs heard [No Pitting Edema] : no pitting edema present [No Abnormalities] : the abdominal aorta was not enlarged and no bruit was heard [Left Carotid Bruit] : no bruit heard over the left carotid [Right Carotid Bruit] : no bruit heard over the right carotid [de-identified] : post partum

## 2024-09-06 ENCOUNTER — APPOINTMENT (OUTPATIENT)
Dept: CARDIOLOGY | Facility: CLINIC | Age: 34
End: 2024-09-06
Payer: COMMERCIAL

## 2024-09-06 PROCEDURE — 93306 TTE W/DOPPLER COMPLETE: CPT

## 2024-09-11 ENCOUNTER — APPOINTMENT (OUTPATIENT)
Dept: CARDIOLOGY | Facility: CLINIC | Age: 34
End: 2024-09-11

## 2024-10-02 ENCOUNTER — NON-APPOINTMENT (OUTPATIENT)
Age: 34
End: 2024-10-02

## 2024-10-02 ENCOUNTER — APPOINTMENT (OUTPATIENT)
Dept: CARDIOLOGY | Facility: CLINIC | Age: 34
End: 2024-10-02
Payer: COMMERCIAL

## 2024-10-02 VITALS
HEART RATE: 59 BPM | OXYGEN SATURATION: 100 % | BODY MASS INDEX: 30.12 KG/M2 | WEIGHT: 170 LBS | DIASTOLIC BLOOD PRESSURE: 78 MMHG | HEIGHT: 63 IN | SYSTOLIC BLOOD PRESSURE: 122 MMHG

## 2024-10-02 PROCEDURE — G2211 COMPLEX E/M VISIT ADD ON: CPT | Mod: NC

## 2024-10-02 PROCEDURE — 99214 OFFICE O/P EST MOD 30 MIN: CPT

## 2024-10-02 PROCEDURE — 93000 ELECTROCARDIOGRAM COMPLETE: CPT

## 2024-10-02 NOTE — REVIEW OF SYSTEMS
[Dizziness] : dizziness [Negative] : Heme/Lymph [de-identified] : occasional in relation to position

## 2024-10-02 NOTE — CARDIOLOGY SUMMARY
[de-identified] : February 8, 2024 normal sinus rhythm 3/27/24 sinus rhythm June 19, 2024 normal sinus rhythm July 29, 2024 normal sinus rhythm frequent PVCs August 6, 2024 sinus rhythm  8/23/24: sinus bradycardia  October 2, 2024 sinus bradycardia [de-identified] : 3/2024  6 day  5% PVC burden  sinus rhythm sinus tachycardia [de-identified] : 8/7/24 -post partum-LVEF 45-50%, Global hypokinesis, mod LAE, mild MR, grade 1 diastolic  2/21/24 LVEF 58% NML LV/RV NML diastolic function  trace TR

## 2024-10-02 NOTE — PHYSICAL EXAM
[Well Developed] : well developed [Well Nourished] : well nourished [No Acute Distress] : no acute distress [Normal Conjunctiva] : normal conjunctiva [Normal Venous Pressure] : normal venous pressure [No Carotid Bruit] : no carotid bruit [Normal S1, S2] : normal S1, S2 [No Rub] : no rub [No Gallop] : no gallop [Rhythm Regular] : regular [Normal S1] : normal S1 [Normal S2] : normal S2 [No Murmur] : no murmurs heard [No Pitting Edema] : no pitting edema present [No Abnormalities] : the abdominal aorta was not enlarged and no bruit was heard [Clear Lung Fields] : clear lung fields [Good Air Entry] : good air entry [No Respiratory Distress] : no respiratory distress  [Normal Bowel Sounds] : normal bowel sounds [Normal Gait] : normal gait [No Edema] : no edema [No Cyanosis] : no cyanosis [No Clubbing] : no clubbing [No Varicosities] : no varicosities [No Rash] : no rash [No Skin Lesions] : no skin lesions [Moves all extremities] : moves all extremities [No Focal Deficits] : no focal deficits [Normal Speech] : normal speech [Alert and Oriented] : alert and oriented [Normal memory] : normal memory [Right Carotid Bruit] : no bruit heard over the right carotid [Left Carotid Bruit] : no bruit heard over the left carotid [de-identified] : post partum

## 2024-10-02 NOTE — HISTORY OF PRESENT ILLNESS
[FreeTextEntry1] : Apple presented to the office today for a cardiovascular evaluation.  She was last seen in the office about 6 weeks ago.  She is now 34 years old, without a history of hypertension, diabetes or hypercholesterolemia.  With her first child about 7 years ago, she developed preeclampsia, with about 3 months of mildly elevated blood pressures after delivery.  She did not require antihypertensive medication.  She had palpitations at that time, and was given a diagnosis of "tachycardia ", which was not treated with any AV robby blockers, seemingly.  She is not known to have any underlying structural heart disease.  At baseline, she tries to stay physically active.  With regular physical activities, she feels well, without reproducible chest discomfort or shortness of breath suggestive of angina.  She denies orthopnea, PND and lower extremity edema.  She denies dizziness and syncope.   At 13 weeks pregnant, she developed intermittent palpitations.  She described a few seconds, which often would be experienced when she wakes up in the middle of the night.  She would feel that her heart is accelerated, often with an abrupt decrease in heart rate.  Her sensation of tachycardia seemed to terminate after deep breaths.  This may not happen every day, but seemed to happen more days than not.  In addition, she went to her OB/GYN, and was told that her blood pressure was 130/100.  She was told to see the cardiologist.   We saw her in the office in 2024, and she was feeling well overall.  We reviewed her test results.  Echocardiography was performed 2024.  This revealed a normal ejection fraction, without significant valvular disease.  An extended Holter monitor was performed in 2024.  This revealed an overall sinus rhythm, with a 5% burden of PVCs, asymptomatic.  She was asked to return 3 months later.  I saw her in , and she was feeling well at that time. She was admitted to [facility] between  and 2024, at 37 weeks gestation due to elevated blood pressures at presurgical testing.  She went on to have a urgent low-transverse  section.  Her course at that time was notable for the observation that she had somewhat frequent PVCs.  An echocardiogram was performed, revealing an ejection fraction which was mildly depressed, at 49%.  Additional evaluations were being considered, but she ended up leaving AGAINST MEDICAL ADVICE, and making an appointment to see me.  When I saw her, it was unclear if she was in fact hypertensive, or had reactive hypertension exaggerated by pain and anxiety.  It was unclear whether her PVCs were a symptom of cardiomyopathy, or perhaps a cause.  I asked that she check her blood pressure, and I left open the possibility of starting labetalol. A repeat echocardiogram was performed after last office visit demonstrating LVEF 45-50%.  She was evaluated in the office in 2024, and was feeling well.  Given her cardiomyopathy she was changed to metoprolol succinate.  She was planning on potentially going to [place] during the month of October, and I asked her to return to the office for evaluation prior to leaving.    She does not report any symptoms suggestive of angina, or heart failure.  Whatever edema she had postpartum has resolved.  She is unaware of any irregularities in the heartbeat.  She is tolerating metoprolol without difficulty.

## 2024-10-02 NOTE — DISCUSSION/SUMMARY
[FreeTextEntry1] : Ms Gunter arrives for follow-up.  She arrives in no acute distress.  She does not report symptoms c/w HF or unstable arrhythmia.    ECG illustrates sinus bradycardia without ectopy.  Her blood pressure is well-controlled.  My review of her echocardiogram from 8/7/24  continues to exhibit mild LV dysfunction-global hypokinesis, LEF 45-50%.  Echocardiography was performed September 6, 2024.  There was moderate LV dilatation with an ejection fraction in the low normal range of 55%.  There was increased LV mass and eccentric hypertrophy.  There was mild to moderate mitral and minimal aortic regurgitation.  We discussed the importance in management of estephania partum cardiomyopathy.  I do not think her cardiomyopathy is related to the PVCs, but suspect the PVCs were more a reflection of her cardiomyopathy, rather than a precipitant.  I would continue metoprolol succinate.  I have suggested a follow-up in about 6 weeks.  I have suggested an echocardiogram be repeated 1 week before that visit.   [EKG obtained to assist in diagnosis and management of assessed problem(s)] : EKG obtained to assist in diagnosis and management of assessed problem(s)

## 2024-11-05 ENCOUNTER — APPOINTMENT (OUTPATIENT)
Dept: CARDIOLOGY | Facility: CLINIC | Age: 34
End: 2024-11-05
Payer: COMMERCIAL

## 2024-11-05 PROCEDURE — 93308 TTE F-UP OR LMTD: CPT

## 2024-11-12 ENCOUNTER — APPOINTMENT (OUTPATIENT)
Dept: CARDIOLOGY | Facility: CLINIC | Age: 34
End: 2024-11-12

## 2024-12-04 ENCOUNTER — OUTPATIENT (OUTPATIENT)
Dept: OUTPATIENT SERVICES | Facility: HOSPITAL | Age: 34
LOS: 1 days | End: 2024-12-04

## 2024-12-04 ENCOUNTER — APPOINTMENT (OUTPATIENT)
Dept: MRI IMAGING | Facility: CLINIC | Age: 34
End: 2024-12-04
Payer: COMMERCIAL

## 2024-12-04 ENCOUNTER — RESULT REVIEW (OUTPATIENT)
Age: 34
End: 2024-12-04

## 2024-12-04 DIAGNOSIS — Z90.49 ACQUIRED ABSENCE OF OTHER SPECIFIED PARTS OF DIGESTIVE TRACT: Chronic | ICD-10-CM

## 2024-12-04 DIAGNOSIS — Z98.89 OTHER SPECIFIED POSTPROCEDURAL STATES: Chronic | ICD-10-CM

## 2024-12-04 DIAGNOSIS — K08.499 PARTIAL LOSS OF TEETH DUE TO OTHER SPECIFIED CAUSE, UNSPECIFIED CLASS: Chronic | ICD-10-CM

## 2024-12-04 PROCEDURE — 75565 CARD MRI VELOC FLOW MAPPING: CPT | Mod: 26

## 2024-12-04 PROCEDURE — 75561 CARDIAC MRI FOR MORPH W/DYE: CPT | Mod: 26

## 2024-12-17 ENCOUNTER — NON-APPOINTMENT (OUTPATIENT)
Age: 34
End: 2024-12-17

## 2024-12-17 ENCOUNTER — APPOINTMENT (OUTPATIENT)
Dept: CARDIOLOGY | Facility: CLINIC | Age: 34
End: 2024-12-17
Payer: COMMERCIAL

## 2024-12-17 VITALS
DIASTOLIC BLOOD PRESSURE: 81 MMHG | HEIGHT: 63 IN | HEART RATE: 65 BPM | BODY MASS INDEX: 31.18 KG/M2 | SYSTOLIC BLOOD PRESSURE: 128 MMHG | OXYGEN SATURATION: 97 % | WEIGHT: 176 LBS

## 2024-12-17 PROCEDURE — 99214 OFFICE O/P EST MOD 30 MIN: CPT

## 2024-12-17 PROCEDURE — G2211 COMPLEX E/M VISIT ADD ON: CPT | Mod: NC

## 2024-12-17 PROCEDURE — 93000 ELECTROCARDIOGRAM COMPLETE: CPT

## 2025-03-26 ENCOUNTER — APPOINTMENT (OUTPATIENT)
Dept: CARDIOLOGY | Facility: CLINIC | Age: 35
End: 2025-03-26
Payer: COMMERCIAL

## 2025-03-26 ENCOUNTER — NON-APPOINTMENT (OUTPATIENT)
Age: 35
End: 2025-03-26

## 2025-03-26 VITALS
SYSTOLIC BLOOD PRESSURE: 103 MMHG | HEART RATE: 73 BPM | DIASTOLIC BLOOD PRESSURE: 76 MMHG | OXYGEN SATURATION: 98 % | BODY MASS INDEX: 31.36 KG/M2 | WEIGHT: 177 LBS | HEIGHT: 63 IN

## 2025-03-26 PROCEDURE — 93000 ELECTROCARDIOGRAM COMPLETE: CPT

## 2025-03-26 PROCEDURE — 99214 OFFICE O/P EST MOD 30 MIN: CPT | Mod: 25

## 2025-04-22 ENCOUNTER — APPOINTMENT (OUTPATIENT)
Dept: CARDIOLOGY | Facility: CLINIC | Age: 35
End: 2025-04-22
Payer: COMMERCIAL

## 2025-04-22 PROCEDURE — 93306 TTE W/DOPPLER COMPLETE: CPT

## 2025-06-30 ENCOUNTER — APPOINTMENT (OUTPATIENT)
Dept: CARDIOLOGY | Facility: CLINIC | Age: 35
End: 2025-06-30
Payer: COMMERCIAL

## 2025-06-30 VITALS
HEIGHT: 63 IN | DIASTOLIC BLOOD PRESSURE: 78 MMHG | BODY MASS INDEX: 32.07 KG/M2 | WEIGHT: 181 LBS | SYSTOLIC BLOOD PRESSURE: 112 MMHG | HEART RATE: 82 BPM | OXYGEN SATURATION: 100 %

## 2025-06-30 PROCEDURE — 99214 OFFICE O/P EST MOD 30 MIN: CPT | Mod: 25

## 2025-06-30 PROCEDURE — 93000 ELECTROCARDIOGRAM COMPLETE: CPT

## 2025-06-30 RX ORDER — LOSARTAN POTASSIUM 25 MG/1
25 TABLET, FILM COATED ORAL
Qty: 90 | Refills: 3 | Status: ACTIVE | COMMUNITY
Start: 2025-06-30 | End: 1900-01-01